# Patient Record
Sex: MALE | Race: WHITE
[De-identification: names, ages, dates, MRNs, and addresses within clinical notes are randomized per-mention and may not be internally consistent; named-entity substitution may affect disease eponyms.]

---

## 2017-05-15 ENCOUNTER — HOSPITAL ENCOUNTER (OUTPATIENT)
Dept: HOSPITAL 80 - FIMAGING | Age: 62
End: 2017-05-15
Attending: FAMILY MEDICINE
Payer: COMMERCIAL

## 2017-05-15 DIAGNOSIS — J18.9: Primary | ICD-10-CM

## 2017-06-03 ENCOUNTER — HOSPITAL ENCOUNTER (INPATIENT)
Dept: HOSPITAL 80 - FED | Age: 62
LOS: 8 days | Discharge: HOME HEALTH | DRG: 193 | End: 2017-06-11
Attending: INTERNAL MEDICINE | Admitting: INTERNAL MEDICINE
Payer: COMMERCIAL

## 2017-06-03 DIAGNOSIS — I24.8: ICD-10-CM

## 2017-06-03 DIAGNOSIS — J96.01: ICD-10-CM

## 2017-06-03 DIAGNOSIS — Q90.9: ICD-10-CM

## 2017-06-03 DIAGNOSIS — J18.9: Primary | ICD-10-CM

## 2017-06-03 DIAGNOSIS — G47.33: ICD-10-CM

## 2017-06-03 DIAGNOSIS — R56.9: ICD-10-CM

## 2017-06-03 DIAGNOSIS — E03.9: ICD-10-CM

## 2017-06-03 DIAGNOSIS — I50.33: ICD-10-CM

## 2017-06-03 DIAGNOSIS — K21.9: ICD-10-CM

## 2017-06-03 LAB
% IMMATURE GRANULYOCYTES: (no result) % (ref 0–1.1)
% IMMATURE GRANULYOCYTES: 0.5 % (ref 0–1.1)
ABSOLUTE IMMATURE GRANULOCYTES: (no result) 10^3/UL (ref 0–0.1)
ABSOLUTE IMMATURE GRANULOCYTES: 0.05 10^3/UL (ref 0–0.1)
ABSOLUTE NRBC COUNT: (no result) 10^3/UL (ref 0–0.01)
ABSOLUTE NRBC COUNT: 0.02 10^3/UL (ref 0–0.01)
ADD DIFF?: (no result)
ADD DIFF?: NO
ADD MORPH?: (no result)
ADD MORPH?: NO
ADD SCAN?: (no result)
ADD SCAN?: NO
ALBUMIN SERPL-MCNC: 3.4 G/DL (ref 3.5–5)
ALP SERPL-CCNC: 92 IU/L (ref 38–126)
ALT SERPL-CCNC: 25 IU/L (ref 21–72)
ANION GAP SERPL CALC-SCNC: 12 MEQ/L (ref 8–16)
AST SERPL-CCNC: 34 IU/L (ref 17–59)
ATYPICAL LYMPHOCYTE FLAG: (no result) (ref 0–99)
ATYPICAL LYMPHOCYTE FLAG: 50 (ref 0–99)
BILIRUB SERPL-MCNC: 0.6 MG/DL (ref 0.1–1.4)
BILIRUBIN-CONJUGATED: 0.5 MG/DL (ref 0–0.5)
BILIRUBIN-UNCONJUGATED: 0.1 MG/DL (ref 0–1.1)
CALCIUM SERPL-MCNC: 8.6 MG/DL (ref 8.5–10.4)
CHLORIDE SERPL-SCNC: 97 MEQ/L (ref 97–110)
CO2 SERPL-SCNC: 26 MEQ/L (ref 22–31)
CREAT SERPL-MCNC: 1 MG/DL (ref 0.7–1.3)
CREATINE KINASE-MB FRACTION: 0.96 NG/ML (ref 0–3.19)
ERYTHROCYTE [DISTWIDTH] IN BLOOD BY AUTOMATED COUNT: (no result) % (ref 11.5–15.2)
ERYTHROCYTE [DISTWIDTH] IN BLOOD BY AUTOMATED COUNT: 14.9 % (ref 11.5–15.2)
FRAGMENT RBC FLAG: (no result) (ref 0–99)
FRAGMENT RBC FLAG: 0 (ref 0–99)
GFR SERPL CREATININE-BSD FRML MDRD: > 60 ML/MIN/{1.73_M2}
GLUCOSE SERPL-MCNC: 121 MG/DL (ref 70–100)
HCT VFR BLD CALC: (no result) % (ref 40–51)
HCT VFR BLD CALC: 35 % (ref 40–51)
HGB BLD-MCNC: (no result) G/DL (ref 13.7–17.5)
HGB BLD-MCNC: 11.8 G/DL (ref 13.7–17.5)
LEFT SHIFT FLG: (no result) (ref 0–99)
LEFT SHIFT FLG: 10 (ref 0–99)
LIPEMIA HEMOLYSIS FLAG: (no result) (ref 0–99)
LIPEMIA HEMOLYSIS FLAG: 80 (ref 0–99)
MAGNESIUM SERPL-MCNC: 2.3 MG/DL (ref 1.6–2.3)
MCH RBC BLDCO QN: (no result) PG (ref 27.9–34.1)
MCH RBC BLDCO QN: 34.2 PG (ref 27.9–34.1)
MCHC RBC AUTO-ENTMCNC: (no result) G/DL (ref 32.4–36.7)
MCHC RBC AUTO-ENTMCNC: 33.7 G/DL (ref 32.4–36.7)
MCV RBC AUTO: (no result) FL (ref 81.5–99.8)
MCV RBC AUTO: 101.4 FL (ref 81.5–99.8)
NRBC-AUTO%: (no result) % (ref 0–0.2)
NRBC-AUTO%: 0.2 % (ref 0–0.2)
PLATELET # BLD: (no result) 10^3/UL (ref 150–400)
PLATELET # BLD: 357 10^3/UL (ref 150–400)
PLATELET CLUMPS FLAG: (no result) (ref 0–99)
PLATELET CLUMPS FLAG: 0 (ref 0–99)
PMV BLD AUTO: (no result) FL (ref 8.7–11.7)
PMV BLD AUTO: 10.4 FL (ref 8.7–11.7)
POTASSIUM SERPL-SCNC: 4.9 MEQ/L (ref 3.5–5.2)
PROT SERPL-MCNC: 7.2 G/DL (ref 6.3–8.2)
RBC # BLD AUTO: (no result) 10^6/UL (ref 4.4–6.38)
RBC # BLD AUTO: 3.45 10^6/UL (ref 4.4–6.38)
SODIUM SERPL-SCNC: 135 MEQ/L (ref 134–144)
TROPONIN I SERPL-MCNC: 0.05 NG/ML (ref 0–0.03)

## 2017-06-03 NOTE — EDPHY
H & P


HPI/ROS: 





HPI





CHIEF COMPLAINT:  Seizure, generalized tonic-clonic 5 minutes





HISTORY OF PRESENT ILLNESS:  This patient is 62-year-old male significant past 

medical history for down syndrome, seizure disorder, presents emergency room by 

EMS after he had a witnessed generalized tonic-clonic seizure in his chair at 

home.  He has a caregiver who witnessed the seizure.  The patient does have a 

history of seizure disorder and take seizure medications according to EMS there 

has been changes in his seizure medication however they were unsure exactly 

what changed to one of his medications.  His caregiver is able to give this 

information but is not here yet.  Patient arrives is alert and oriented x3, 

answering questions appropriately he denies pain anywhere.  EMS reports that he 

was postictal when they evaluated him and he had an O2 sat of 76% the placed on 

a non-rebreather and brought him here to the emergency room.  Upon arrival here 

the patient has a cough and has upper airway secretions with a room air 

saturation of 85%.





Past Medical History:  Seizure disorder, down syndrome, thyroid disease





Past Surgical History:  No recent surgical history





Social History:  Denies daily use drugs alcohol tobacco products





Family History:  Noncontributory








ROS   


REVIEW OF SYSTEMS:


A comprehensive 10 point review of systems is otherwise negative aside from 

elements mentioned in the history of present illness.








Exam   


Constitutional  appears well nontoxic triage nursing summary reviewed, vital 

signs reviewed, awake/alert. (85%RA sat)


Eyes   normal conjunctivae and sclera, EOMI, PERRLA. 


HENT   normal inspection, atraumatic, moist mucus membranes, no epistaxis, neck 

supple/ no meningismus, no raccoon eyes. 


Respiratory  rhonchi in the upper airway and upper lung fields


Cardiovascular   rate normal, regular rhythm, no murmur, no edema, distal 

pulses normal. 


Gastrointestinal   soft, non-tender, no rebound, no guarding, normal bowel 

sounds, no distension, no pulsatile mass. 


Genitourinary   no CVA tenderness. 


Musculoskeletal  no midline vertebral tenderness, full range of motion, no calf 

swelling, no tenderness of extremities, no meningismus, good pulses, 

neurovascularly intact.


Skin   pink, warm, & dry, no rash, skin atraumatic. 


Neurologic   awake, alert and oriented x 3, AAOx3, moves all 4 extremities 

equally, motor intact, sensory intact, CN II-XII intact, normal cerebellar, 

normal vision, normal speech. 


Psychiatric   normal mood/affect. 


Heme/Lymph/Immune   no lymphadenopathy.





Differential Diagnosis:  Includes but is not limited to in a particular order, 

breakthrough seizure, electrolyte disturbance, infection, pneumonia, aspiration 

from seizure, medication change leading to breakthrough seizure





Medical Decision Making:  Plan for this patient full cardiac monitor, IV 

establishment, blood draw, EKG, gentle IV hydration chest x-ray two view to 

rule out aspiration or recent pneumonia.





Re-evaluation:








2316:  At this time the caregiver has shown up and additional history is given.

  The caregiver tells me that he thinks he may have had a seizure was seated at 

the kitchen table and had a brief episode of unresponsiveness with staring off 

into space and some hand jerking.  It is reported by the caregiver that he was 

not confused afterwards there was no postictal state.  The caregiver thinks he 

may have passed out.  He also reports that he has been dealing with bronchitis 

for 2 weeks and shortness of breath labored breathing at home.  It was reported 

by EMS that he had a recent seizure medication change however the caregiver 

tells me that there has been no change in his medications.  Also reports that 

there was no postictal state.





EKG interpretation by me on record in [facility] system.  Impression time of 

EKG 2309:  This is sinus rhythm rate of 60, I do not appreciate acute ischemic 

changes on his EKG.  And 1 I compare his EKG to old EKG dated 10/7/2009 is 

similar morphology.





ED x-ray chest:  Cardiomegaly present.  Significant amount of pulmonary edema 

with bat winning appearance.








1222:  At this time I did go and see the patient he still has mild labored 

breathing and rhonchi throughout all lung fields.  His workup here in the 

emergency room shows a positive troponin shows an elevated BNP shows an x-ray 

that is concerning for decompensated heart failure with cardiomegaly bilateral 

pulmonary edema.  I have ordered this patient 40 mg IV Lasix, full-dose aspirin

, he does not have chest pain. He is on a breathing treatment at this time.  He 

is stable. He does have labored breathing.  He will need to be admitted to PCU 

for decompensated heart failure.  His CT angiogram at this time is pending for 

PE.  This will also help us visualize his lung fields as this could also be 

pneumonia with elevated white blood cell count however he does not have an 

elevated lactic acid.  He did receive initially 500 cc of fluid normal saline 

when he arrived they have stopped all this fluid at this time.  I spoke with 

Dr. Mahan the hospitalist who agrees to admit this patient.





1256AM:  This patient's CT angiogram shows no pulmonary embolism however does 

show a dense right pneumonia.  Blood cultures have been ordered.  IV Rocephin 

azithromycin has been ordered.


Source: Patient, EMS





- Personal History


Tetanus Vaccine Date: <10 years





- Medical/Surgical History


Other PMH: Down syndrome, hypothyroid





- Social History


Smoking Status: Never smoked


Constitutional: 


 Initial Vital Signs











O2 Sat (%)  94   06/03/17 22:45








 











O2 Delivery Mode               Oxymizer


 


O2 (L/minute)                  4














Allergies/Adverse Reactions: 


 





carbamazepine [From Tegretol] Allergy (Verified 06/03/17 22:59)


 


NONFORM Allergy (Uncoded 12/13/12 09:55)


 


OIL BASED PAINTS Allergy (Uncoded 12/13/12 09:55)


 








Home Medications: 














 Medication  Instructions  Recorded


 


Flonase Nasal Spray  03/05/16


 


Ibuprofen [Motrin (*)] 600 mg PO Q8 #20 tab 03/05/16


 


Lamictal  03/05/16


 


Metamucil  03/05/16


 


Multivit with Calcium,Iron,Min  03/05/16


 


Omega-3  03/05/16


 


Prilosec  03/05/16


 


Stelazine 2MG (RX)  03/05/16


 


Synthroid  03/05/16


 


Wellbutrin 100mg (RX)  03/05/16














Medical Decision Making





- Diagnostics


Imaging Results: 


 Imaging Impressions





Chest X-Ray  06/03/17 22:45


Impression: Abnormalities as detailed above, congestive heart failure with 

possible associated pneumonia not excluded.








Chest/Thorax CTA  06/03/17 23:32


Impression: 


1. No evidence of pulmonary embolic disease.


2. Dense right lower lobe pulmonary consolidation is presumably pneumonia. 

Follow-up is recommended to assure resolution.


3. See above report for additional findings.


 


Results called and discussed with Johnny Hamm MD on 6/4/2017 at 0:40


 


 


 














- Data Points


Laboratory Results: 


 Laboratory Results





 06/03/17 22:50 





 06/03/17 22:30 





 











  06/03/17 06/03/17 06/03/17





  23:55 22:50 22:50


 


WBC      10.25 10^3/uL H 10^3/uL





     (3.80-9.50) 


 


RBC      3.45 10^6/uL L 10^6/uL





     (4.40-6.38) 


 


Hgb      11.8 g/dL L g/dL





     (13.7-17.5) 


 


Hct      35.0 % L %





     (40.0-51.0) 


 


MCV      101.4 fL H fL





     (81.5-99.8) 


 


MCH      34.2 pg H pg





     (27.9-34.1) 


 


MCHC      33.7 g/dL g/dL





     (32.4-36.7) 


 


RDW      14.9 % %





     (11.5-15.2) 


 


Plt Count      357 10^3/uL 10^3/uL





     (150-400) 


 


MPV      10.4 fL fL





     (8.7-11.7) 


 


Neut % (Auto)      74.5 % H %





     (39.3-74.2) 


 


Lymph % (Auto)      13.8 % L %





     (15.0-45.0) 


 


Mono % (Auto)      10.5 % %





     (4.5-13.0) 


 


Eos % (Auto)      0.0 % L %





     (0.6-7.6) 


 


Baso % (Auto)      0.7 % %





     (0.3-1.7) 


 


Nucleat RBC Rel Count      0.2 % %





     (0.0-0.2) 


 


Absolute Neuts (auto)      7.64 10^3/uL H 10^3/uL





     (1.70-6.50) 


 


Absolute Lymphs (auto)      1.41 10^3/uL 10^3/uL





     (1.00-3.00) 


 


Absolute Monos (auto)      1.08 10^3/uL H 10^3/uL





     (0.30-0.80) 


 


Absolute Eos (auto)      0.00 10^3/uL L 10^3/uL





     (0.03-0.40) 


 


Absolute Basos (auto)      0.07 10^3/uL 10^3/uL





     (0.02-0.10) 


 


Absolute Nucleated RBC      0.02 10^3/uL H 10^3/uL





     (0-0.01) 


 


Immature Gran %      0.5 % %





     (0.0-1.1) 


 


Immature Gran #      0.05 10^3/uL 10^3/uL





     (0.00-0.10) 


 


VBG Lactic Acid    1.2 mmol/L mmol/L  





    (0.7-2.1)  


 


Sodium      





    


 


Potassium      





    


 


Chloride      





    


 


Carbon Dioxide      





    


 


Anion Gap      





    


 


BUN      





    


 


Creatinine      





    


 


Estimated GFR      





    


 


Glucose      





    


 


Calcium      





    


 


Magnesium      





    


 


Total Bilirubin      





    


 


Conjugated Bilirubin      





    


 


Unconjugated Bilirubin      





    


 


AST      





    


 


ALT      





    


 


Alkaline Phosphatase      





    


 


Creatine Kinase      





    


 


CK-MB (CK-2) Fraction      





    


 


Troponin I      





    


 


NT-Pro-B Natriuret Pep      





    


 


Total Protein      





    


 


Albumin      





    


 


Lipase      





    


 


Lamotrigine  Pending     





    














  06/03/17 06/03/17





  22:30 22:30


 


WBC    REJ 





   


 


RBC    TNP 





   


 


Hgb    TNP 





   


 


Hct    TNP 





   


 


MCV    TNP 





   


 


MCH    TNP 





   


 


MCHC    TNP 





   


 


RDW    TNP 





   


 


Plt Count    TNP 





   


 


MPV    TNP 





   


 


Neut % (Auto)    TNP 





   


 


Lymph % (Auto)    TNP 





   


 


Mono % (Auto)    TNP 





   


 


Eos % (Auto)    TNP 





   


 


Baso % (Auto)    TNP 





   


 


Nucleat RBC Rel Count    TNP 





   


 


Absolute Neuts (auto)    TNP 





   


 


Absolute Lymphs (auto)    TNP 





   


 


Absolute Monos (auto)    TNP 





   


 


Absolute Eos (auto)    TNP 





   


 


Absolute Basos (auto)    TNP 





   


 


Absolute Nucleated RBC    TNP 





   


 


Immature Gran %    TNP 





   


 


Immature Gran #    TNP 





   


 


VBG Lactic Acid    





   


 


Sodium  135 mEq/L mEq/L  





   (134-144)  


 


Potassium  4.9 mEq/L mEq/L  





   (3.5-5.2)  


 


Chloride  97 mEq/L mEq/L  





   ()  


 


Carbon Dioxide  26 mEq/l mEq/l  





   (22-31)  


 


Anion Gap  12 mEq/L mEq/L  





   (8-16)  


 


BUN  14 mg/dL mg/dL  





   (7-23)  


 


Creatinine  1.0 mg/dL mg/dL  





   (0.7-1.3)  


 


Estimated GFR  > 60   





   


 


Glucose  121 mg/dL H mg/dL  





   ()  


 


Calcium  8.6 mg/dL mg/dL  





   (8.5-10.4)  


 


Magnesium  2.3 mg/dL mg/dL  





   (1.6-2.3)  


 


Total Bilirubin  0.6 mg/dL mg/dL  





   (0.1-1.4)  


 


Conjugated Bilirubin  0.5 mg/dL mg/dL  





   (0.0-0.5)  


 


Unconjugated Bilirubin  0.1 mg/dL mg/dL  





   (0.0-1.1)  


 


AST  34 IU/L IU/L  





   (17-59)  


 


ALT  25 IU/L IU/L  





   (21-72)  


 


Alkaline Phosphatase  92 IU/L IU/L  





   ()  


 


Creatine Kinase  70 IU/L IU/L  





   (0-224)  


 


CK-MB (CK-2) Fraction  0.96 ng/mL ng/mL  





   (0-3.19)  


 


Troponin I  0.047 ng/mL H ng/mL  





   (0-0.034)  


 


NT-Pro-B Natriuret Pep  761 pg/mL H pg/mL  





   (0-125)  


 


Total Protein  7.2 g/dL g/dL  





   (6.3-8.2)  


 


Albumin  3.4 g/dL L g/dL  





   (3.5-5.0)  


 


Lipase  48.0 IU/L IU/L  





   ()  


 


Lamotrigine    





   











Medications Given: 


 








Discontinued Medications





Albuterol/Ipratropium (Duoneb)  3 ml IH EDNOW ONE


   Stop: 06/03/17 22:47


   Last Admin: 06/03/17 23:05 Dose:  3 ml


Aspirin Buffered (Aspirin Ec)  325 mg PO EDNOW ONE


   Stop: 06/03/17 23:34


   Last Admin: 06/03/17 23:45 Dose:  325 mg


Furosemide (Lasix Injection)  40 mg IVP EDNOW ONE


   Stop: 06/03/17 23:33


   Last Admin: 06/03/17 23:45 Dose:  40 mg


Sodium Chloride (Ns)  500 mls @ 0 mls/hr IV ONCE ONE


   PRN Reason: As Directed


   Stop: 06/03/17 22:46


   Last Admin: 06/03/17 23:06 Dose:  500 mls


Azithromycin 500 mg/ Dextrose  255 mls @ 255 mls/hr IV EDNOW ONE


   PRN Reason: Protocol


   Stop: 06/04/17 00:37


   Last Admin: 06/04/17 00:45 Dose:  255 mls


Ceftriaxone Sodium/Dextrose (Rocephin 1 Gm (Premix))  50 mls @ 100 mls/hr IV 

EDNOW ONE


   PRN Reason: Protocol


   Stop: 06/04/17 00:07


   Last Admin: 06/04/17 00:34 Dose:  50 mls








Departure





- Departure


Disposition: Home, Routine, Self-Care


Clinical Impression: 


 Hypoxia





CHF (congestive heart failure)


Qualifiers:


 Congestive heart failure type: unspecified congestive heart failure type 

Congestive heart failure chronicity: acute Qualified Code(s): I50.9 - Heart 

failure, unspecified





Pneumonia


Qualifiers:


 Pneumonia type: due to unspecified organism Laterality: right Lung location: 

lower lobe of lung Qualified Code(s): J18.1 - Lobar pneumonia, unspecified 

organism





Condition: Serious

## 2017-06-04 LAB
% IMMATURE GRANULYOCYTES: 0.5 % (ref 0–1.1)
ABSOLUTE IMMATURE GRANULOCYTES: 0.05 10^3/UL (ref 0–0.1)
ABSOLUTE NRBC COUNT: 0 10^3/UL (ref 0–0.01)
ADD DIFF?: NO
ADD MORPH?: NO
ADD SCAN?: NO
ANION GAP SERPL CALC-SCNC: 7 MEQ/L (ref 8–16)
APTT BLD: 31.5 SEC (ref 23–38)
ATYPICAL LYMPHOCYTE FLAG: 0 (ref 0–99)
CALCIUM SERPL-MCNC: 8.2 MG/DL (ref 8.5–10.4)
CHLORIDE SERPL-SCNC: 96 MEQ/L (ref 97–110)
CO2 SERPL-SCNC: 28 MEQ/L (ref 22–31)
COLOR UR: (no result)
CREAT SERPL-MCNC: 1 MG/DL (ref 0.7–1.3)
CREATINE KINASE-MB FRACTION: 0.9 NG/ML (ref 0–3.19)
ERYTHROCYTE [DISTWIDTH] IN BLOOD BY AUTOMATED COUNT: 14.4 % (ref 11.5–15.2)
FRAGMENT RBC FLAG: 0 (ref 0–99)
GFR SERPL CREATININE-BSD FRML MDRD: > 60 ML/MIN/{1.73_M2}
GLUCOSE SERPL-MCNC: 118 MG/DL (ref 70–100)
HCT VFR BLD CALC: 32.9 % (ref 40–51)
HGB BLD-MCNC: 11.2 G/DL (ref 13.7–17.5)
INR PPP: 1.17 (ref 0.83–1.16)
LEFT SHIFT FLG: 10 (ref 0–99)
LIPEMIA HEMOLYSIS FLAG: 90 (ref 0–99)
MAGNESIUM SERPL-MCNC: 2.2 MG/DL (ref 1.6–2.3)
MCH RBC BLDCO QN: 33.6 PG (ref 27.9–34.1)
MCHC RBC AUTO-ENTMCNC: 34 G/DL (ref 32.4–36.7)
MCV RBC AUTO: 98.8 FL (ref 81.5–99.8)
NITRITE UR QL STRIP: NEGATIVE
NRBC-AUTO%: 0 % (ref 0–0.2)
PH UR STRIP: 7 [PH] (ref 5–7.5)
PLATELET # BLD: 378 10^3/UL (ref 150–400)
PLATELET CLUMPS FLAG: 0 (ref 0–99)
PMV BLD AUTO: 10.1 FL (ref 8.7–11.7)
POTASSIUM SERPL-SCNC: 4.5 MEQ/L (ref 3.5–5.2)
PROTHROMBIN TIME: 14.9 SEC (ref 12–15)
RBC # BLD AUTO: 3.33 10^6/UL (ref 4.4–6.38)
SODIUM SERPL-SCNC: 131 MEQ/L (ref 134–144)
SP GR UR STRIP: 1.01 (ref 1–1.03)
TROPONIN I SERPL-MCNC: 0.05 NG/ML (ref 0–0.03)

## 2017-06-04 RX ADMIN — GUAIFENESIN SCH MG: 600 TABLET, EXTENDED RELEASE ORAL at 20:52

## 2017-06-04 RX ADMIN — GUAIFENESIN SCH MG: 600 TABLET, EXTENDED RELEASE ORAL at 14:40

## 2017-06-04 RX ADMIN — TRIFLUOPERAZINE HYDROCHLORIDE SCH MG: 1 TABLET, FILM COATED ORAL at 21:22

## 2017-06-04 RX ADMIN — AZITHROMYCIN MONOHYDRATE SCH MLS: 500 INJECTION, POWDER, LYOPHILIZED, FOR SOLUTION INTRAVENOUS at 09:28

## 2017-06-04 RX ADMIN — TRIFLUOPERAZINE HYDROCHLORIDE SCH MG: 1 TABLET, FILM COATED ORAL at 01:37

## 2017-06-04 RX ADMIN — IPRATROPIUM BROMIDE AND ALBUTEROL SULFATE SCH ML: .5; 3 SOLUTION RESPIRATORY (INHALATION) at 17:04

## 2017-06-04 RX ADMIN — ENOXAPARIN SODIUM SCH MG: 100 INJECTION SUBCUTANEOUS at 09:27

## 2017-06-04 RX ADMIN — FLUTICASONE PROPIONATE SCH SPRAY: 50 SPRAY, METERED NASAL at 21:07

## 2017-06-04 RX ADMIN — TRIFLUOPERAZINE HYDROCHLORIDE SCH MG: 1 TABLET, FILM COATED ORAL at 01:38

## 2017-06-04 RX ADMIN — ERTAPENEM SODIUM SCH MLS: 1 INJECTION, POWDER, LYOPHILIZED, FOR SOLUTION INTRAMUSCULAR; INTRAVENOUS at 09:28

## 2017-06-04 RX ADMIN — FLUTICASONE PROPIONATE SCH PUFFS: 110 AEROSOL, METERED RESPIRATORY (INHALATION) at 21:59

## 2017-06-04 RX ADMIN — IBUPROFEN SCH MG: 600 TABLET ORAL at 20:51

## 2017-06-04 RX ADMIN — IPRATROPIUM BROMIDE AND ALBUTEROL SULFATE SCH ML: .5; 3 SOLUTION RESPIRATORY (INHALATION) at 22:00

## 2017-06-04 NOTE — HOSPPROG
Hospitalist Progress Note


Assessment/Plan: 





#  acute hypoxic respiratory failure-2/2  community-acquired pneumonia-  oxygen 

saturation 75% on room air


    chest CT( personally reviewed and interpreted)  dense right lower lobe 

pneumonia no PE


   -  continue IV antibiotics


   -  continue inhaled  treatment


   -  encourage IS





#  community-acquired pneumonia-  blood cultures from emergency department 

pending-  WBC  10.5


   -  continue IV  ertapenem and azithromycin ( covering for aspiration until 

seen by speech)


   -  speech therapy eval ordered





#  indeterminate troponin-  very suspicious secondary to acute illness-  no 

active chest pain complaints


   -  continue to monitor





#  seizure disorder-  continue home meds





#  diet regular


#  prophylaxis Lovenox


# disposition greater than 2 midnights of patient presenting with severe 

community-acquired pneumonia with dense right infiltrate





 I have discussed the case with the RN will continue aggressive pulmonary 

toilet and supportive care


Subjective: short of breath


Objective: 


 Vital Signs











Temp Pulse Resp BP Pulse Ox


 


 36.8 C   66   16   106/62   75 L


 


 06/04/17 11:49  06/04/17 11:49  06/04/17 11:49  06/04/17 11:49  06/04/17 13:37








 Laboratory Results





 06/04/17 05:10 





 06/04/17 05:10 





 











 06/03/17 06/04/17 06/05/17





 05:59 05:59 05:59


 


Intake Total  500 


 


Output Total  850 375


 


Balance  -350 -375








 











PT  14.9 SEC (12.0-15.0)   06/04/17  05:10    


 


INR  1.17  (0.83-1.16)  H  06/04/17  05:10    














- Physical Exam


Constitutional: chronically ill appearing


Eyes: anicteric sclera


Ears, Nose, Mouth, Throat: dry mucous membranes


Cardiovascular: regular rate and rhythym


Respiratory: expiratory wheeze, inspiratory crackles


Gastrointestinal: normoactive bowel sounds


Genitourinary: no bladder fullness


Skin: warm, normal color


Musculoskeletal: No asymmetric calves


Neurologic: No asterixes, No facial droop


Psychiatric: interacting appropriately


Lymph, Heme, Immunologic: no cervical LAD





ICD10 Worksheet


Patient Problems: 


 Problems











Problem Status Onset


 


CHF (congestive heart failure) Acute  


 


Hypoxia Acute  


 


Pneumonia Acute

## 2017-06-04 NOTE — ECHO
[id number]

[id number]



+---------------------------------------------------+      [address]

:                                                   :       [place] [address]

:                                                   :           [phone]

+---------------------------------------------------+       Fax [phone]



                       Adult Echocardiographic Report



+----------------------------------------------------------------------------

----+

:Name: AMANDA CHONG BStudy Date: 2017 10:48 AM                         

    :

:MRN: [id number]     Hospital Admission Number: [id number] Location:

 206:

:: 1955     Gender: Male                           Height: 60 in    

    :

:Age: 62 yrs         Race: WH                               Weight: 160 lb   

    :

:Reason For Study: Eval LV FX                                                

    :

:                                                           BSA: 1.7 meters2 

    :

:History: Cough, SOB                                                         

    :

+----------------------------------------------------------------------------

----+

MMode/2D Measurements \T\ Calculations

IVSd: 1.1 cm   LVIDd: 4.1 cm  FS: 35.6 %             Ao root diam: 2.6 cm

LVPWd: 1.1 cm  LVIDs: 2.6 cm  EDV(Teich): 74.9 ml    ACS: 1.8 cm

                              ESV(Teich): 25.8 ml

                              EF(Teich): 65.6 %



Normal Measurement Values:

+----------------------------------------------------------------------------

----------------------------------+

:LVIDd (3.5-5.7cm)    IVSd (0.6-1.1cm)     LVPWd (0.6-1.1cm)     Aortic Root 

(2.0-3.7cm)Left Atrium (1.5-4.0cm):

:LV Vol(d) (76-115ml) LV Vol(s) (29-48ml)  Ejec Fraction (50-65%)PV Ganesh (0.6-

1.2m/s)    TV Ganesh (0.4-1.0m/s)    :

:MV E Ganesh (0.8-1.0m/s)MV A Ganesh (0.3-1.0m/s)LVOT Ganesh (0.7-1.2m/s) Asc Ao Ganesh (

0.9-1.8m/s)                       :

+----------------------------------------------------------------------------

----------------------------------+

Doppler Measurements \T\ Calculations

MV E max ganesh:        Ao V2 max:         LV V1 max:       PA V2 max:

76.0 cm/sec          126.2 cm/sec       74.0 cm/sec      120.8 cm/sec

MV A max ganesh:        Ao max PG:         LV V1 max PG:    PA max P.2 cm/sec         6.4 mmHg           2.2 mmHg         5.8 mmHg

MV E/A: 0.75

        _____________________________________________________________



TR max ganesh:

291.8 cm/sec

TR max P.1 mmHg

RAP systole:

5.0 mmHg

RVSP(TR): 39.1 mmHg



Left Ventricle

The left ventricle is normal in size. There is normal left ventricular wall

thickness. The left ventricular ejection fraction is normal. Ejection

Fraction = 65%. There is Doppler evidence for diastolic dysfunction. The

left ventricular wall motion is normal.





Right Ventricle

Borderline right ventricular enlargement. The right ventricular systolic

function is normal.



Atria

The left atrial size is normal. Right atrial size is normal.





Mitral Valve

The mitral valve is normal in structure and function. There is no mitral

regurgitation noted.



Tricuspid Valve

There is trace to mild tricuspid regurgitation. Right ventricular systolic

pressure is 40mmHg. There is Doppler evidence for mild pulmonary

hypertension.



Aortic Valve

The aortic valve is normal in structure and function. The aortic valve is

trileaflet. There is no aortic stenosis. Trace to mild aortic regurgitation.



Pulmonic Valve

The pulmonic valve is normal in structure and function. Trace pulmonic

valvular regurgitation.



Great Vessels

The aortic root is normal size.





Pericardium/Pleural

There is no pericardial effusion.



Conclusion

A complete two-dimensional transthoracic echocardiogram was performed (2D,

M-mode, Doppler and color flow Doppler).

The left ventricular ejection fraction is normal.

Ejection Fraction = 65%.

The left ventricular wall motion is normal.

There is Doppler evidence for diastolic dysfunction.

Borderline right ventricular enlargement.

The right ventricular systolic function is normal.

Normal appearing valvular structures.

Trace to mild aortic regurgitation.

There is trace to mild tricuspid regurgitation.

There is Doppler evidence for mild pulmonary hypertension.

Right ventricular systolic pressure is 40mmHg.

Trace pulmonic valvular regurgitation.



_____________________________________________________________________________







Final Reading Physician:

                        Herberth Kang signed on 2017 12:40 PM

Ordering Physician: Cami Mahan

Performed By: Gui Bliss, KARISCS

## 2017-06-04 NOTE — PDGENHP
History and Physical





- Chief Complaint


unresponsive episode, hypoxia





- History of Present Illness


Patient is a 62 year old male with down syndrome, seizure disorder, GERD, 

hypothyroidism, FAN who presents to the ED after an unresponsive episode at 

home. About 3 weeks ago, patient developed upper respiratory symptoms, with 

cough, chest congestion, mild dyspnea. He was evaluated by his PMD at that time 

and was determined to have an acute bronchitis, was given a 5-day course of 

antibiotics. His symptoms initially improved, however, never resolved 

completely and over the past several days his cough again began to worsen, 

become productive of thick sputum and associated with significant shortness of 

breath. This evening, while sitting down for dinner (had not yet started eating

) he had heavy breathing and then became unresponsive for a short period of 

time (< 1 min). The unresponsiveness was associated with slight facial twitching

; he did not fall out of his chair and regained consciousness. However, his 

breathing continued to appear labored. This episode was witnessed by his 

caregiver, who then immediately called EMS. Patient denies any chest pain, 

palpitations during this time, just cough and shortness of breath.





On arrival to the ED, patient was afebrile and hemodynamically stable, but 

hypoxic on room air. CXR was obtained and revealed basilar opacities. Labs were 

significant for mild leukocytosis, indeterminately elevated troponin, mildly 

elevated BNP. CT chest was then obtained to rule out pulmonary embolism, was 

negative for PE, but did show a dense RLL infiltrate consistent with pneumonia. 

Patient was then cultured, initiated on antibiotics and admitted for further 

management. 





History Information





- Allergies/Home Medication List


Allergies/Adverse Reactions: 








carbamazepine [From Tegretol] Allergy (Verified 06/03/17 22:59)


 


NONFORM Allergy (Uncoded 12/13/12 09:55)


 


OIL BASED PAINTS Allergy (Uncoded 12/13/12 09:55)


 





Home Medications: 








Flonase Nasal Spray  03/05/16 [Last Taken Unknown]


Lamictal  03/05/16 [Last Taken Unknown]


Metamucil  03/05/16 [Last Taken Unknown]


Multivit with Calcium,Iron,Min  03/05/16 [Last Taken Unknown]


Omega-3  03/05/16 [Last Taken Unknown]


Prilosec  03/05/16 [Last Taken Unknown]


Stelazine 2MG (RX)  03/05/16 [Last Taken Unknown]


Synthroid  03/05/16 [Last Taken Unknown]


Wellbutrin 100mg (RX)  03/05/16 [Last Taken Unknown]





I have personally reviewed and updated: family history, medical history, social 

history, surgical history





- Past Medical History


Additional medical history: Down syndrome.  GERD.  FAN on CPAP at night.  

seizure disorder, last witnessed seizure was > 8 years ago.  hypothyroidism





- Surgical History


Reports: no pertinent surgical hx





- Family History


Positive for: non-pertinent





- Social History


Smoking Status: Never smoked


Alcohol Use: None


Drug Use: None


Additional social history: Lives with a  24/7 caregiver.





Review of Systems


ROS: 10pt was reviewed & negative except for what was stated in HPI & below





Physical Exam














Temp Pulse Resp BP Pulse Ox


 


 37 C   61   20   118/64   97 


 


 06/03/17 22:58  06/04/17 00:00  06/04/17 00:00  06/04/17 00:00  06/04/17 00:00











Constitutional: no apparent distress, appears nourished, not in pain


Eyes: PERRL, anicteric sclera, EOMI


Ears, Nose, Mouth, Throat: moist mucous membranes, hearing normal, ears appear 

normal, no oral mucosal ulcers


Cardiovascular: regular rate and rhythym, no murmur, rub, or gallop, pulses 

symmetric bilaterally, No JVD, No edema


Peripheral Pulses: 2+: dorsalis-pedis (R), dorsalis-pedis (L)


Respiratory: no respiratory distress, inspiratory crackles, rhonchi (at R lung 

base)


Gastrointestinal: normoactive bowel sounds, soft, non-tender abdomen, no 

palpable masses, No tenderness, No guarding, No rebound


Genitourinary: no bladder fullness, no bladder tenderness


Skin: warm, normal color, no rashes or abrasions, no fluctuance, no induration, 

No mottled


Musculoskeletal: full muscle strength, no muscle tenderness, normal joint ROM, 

no joint effusions


Neurologic: AAOx3, sensation intact bilaterally, CN II-XII Intact, No weakness, 

No numbness


Psychiatric: interacting appropriately, not anxious, not encephalopathic, 

thought process linear





Lab Data & Imaging Review





 06/04/17 05:10





 06/04/17 05:10














WBC  10.25 10^3/uL (3.80-9.50)  H  06/03/17  22:50    


 


RBC  3.45 10^6/uL (4.40-6.38)  L  06/03/17  22:50    


 


Hgb  11.8 g/dL (13.7-17.5)  L  06/03/17  22:50    


 


Hct  35.0 % (40.0-51.0)  L  06/03/17  22:50    


 


MCV  101.4 fL (81.5-99.8)  H  06/03/17  22:50    


 


MCH  34.2 pg (27.9-34.1)  H  06/03/17  22:50    


 


MCHC  33.7 g/dL (32.4-36.7)   06/03/17  22:50    


 


RDW  14.9 % (11.5-15.2)   06/03/17  22:50    


 


Plt Count  357 10^3/uL (150-400)   06/03/17  22:50    


 


MPV  10.4 fL (8.7-11.7)   06/03/17  22:50    


 


Neut % (Auto)  74.5 % (39.3-74.2)  H  06/03/17  22:50    


 


Lymph % (Auto)  13.8 % (15.0-45.0)  L  06/03/17  22:50    


 


Mono % (Auto)  10.5 % (4.5-13.0)   06/03/17  22:50    


 


Eos % (Auto)  0.0 % (0.6-7.6)  L  06/03/17  22:50    


 


Baso % (Auto)  0.7 % (0.3-1.7)   06/03/17  22:50    


 


Nucleat RBC Rel Count  0.2 % (0.0-0.2)   06/03/17  22:50    


 


Absolute Neuts (auto)  7.64 10^3/uL (1.70-6.50)  H  06/03/17  22:50    


 


Absolute Lymphs (auto)  1.41 10^3/uL (1.00-3.00)   06/03/17  22:50    


 


Absolute Monos (auto)  1.08 10^3/uL (0.30-0.80)  H  06/03/17  22:50    


 


Absolute Eos (auto)  0.00 10^3/uL (0.03-0.40)  L  06/03/17  22:50    


 


Absolute Basos (auto)  0.07 10^3/uL (0.02-0.10)   06/03/17  22:50    


 


Absolute Nucleated RBC  0.02 10^3/uL (0-0.01)  H  06/03/17  22:50    


 


Immature Gran %  0.5 % (0.0-1.1)   06/03/17  22:50    


 


Immature Gran #  0.05 10^3/uL (0.00-0.10)   06/03/17  22:50    


 


VBG Lactic Acid  1.2 mmol/L (0.7-2.1)   06/03/17  22:50    


 


Sodium  135 mEq/L (134-144)   06/03/17  22:30    


 


Potassium  4.9 mEq/L (3.5-5.2)   06/03/17  22:30    


 


Chloride  97 mEq/L ()   06/03/17  22:30    


 


Carbon Dioxide  26 mEq/l (22-31)   06/03/17  22:30    


 


Anion Gap  12 mEq/L (8-16)   06/03/17  22:30    


 


BUN  14 mg/dL (7-23)   06/03/17  22:30    


 


Creatinine  1.0 mg/dL (0.7-1.3)   06/03/17  22:30    


 


Estimated GFR  > 60   06/03/17  22:30    


 


Glucose  121 mg/dL ()  H  06/03/17  22:30    


 


Calcium  8.6 mg/dL (8.5-10.4)   06/03/17  22:30    


 


Magnesium  2.3 mg/dL (1.6-2.3)   06/03/17  22:30    


 


Total Bilirubin  0.6 mg/dL (0.1-1.4)   06/03/17  22:30    


 


Conjugated Bilirubin  0.5 mg/dL (0.0-0.5)   06/03/17  22:30    


 


Unconjugated Bilirubin  0.1 mg/dL (0.0-1.1)   06/03/17  22:30    


 


AST  34 IU/L (17-59)   06/03/17  22:30    


 


ALT  25 IU/L (21-72)   06/03/17  22:30    


 


Alkaline Phosphatase  92 IU/L ()   06/03/17  22:30    


 


Creatine Kinase  70 IU/L (0-224)   06/03/17  22:30    


 


CK-MB (CK-2) Fraction  0.96 ng/mL (0-3.19)   06/03/17  22:30    


 


Troponin I  0.047 ng/mL (0-0.034)  H  06/03/17  22:30    


 


NT-Pro-B Natriuret Pep  761 pg/mL (0-125)  H  06/03/17  22:30    


 


Total Protein  7.2 g/dL (6.3-8.2)   06/03/17  22:30    


 


Albumin  3.4 g/dL (3.5-5.0)  L  06/03/17  22:30    


 


Lipase  48.0 IU/L ()   06/03/17  22:30    








Visualized and Interpreted Chest x-ray results: Yes


Chest X-Ray results: infiltrate (basilar)


Visualized and Interpreted imaging results: Yes


Interpretation: CT angio chest: no pulmonary embolism, dense R lower lung 

infiltrate


Visualized and Interpreted EKG results: Yes


EKG Interpretation: Positive for: normal sinsus rhythm (no st/t wave changes)





Assessment & Plan


Assessment: 


Patient is a 62 year old male with Down syndrome, GERD, hypothyroidism, Seizure 

disorder and FAN who presents to the ED with respiratory distress and an 

unresponsive episode.








Plan: 





# acute hypoxic respiratory failure


On initial arrival, thought respiratory distress was due to chf/fluid overload 

given elevated BNP/indeterminant troponin. However, once CT chest was obtained, 

it revealed a dense RLL consolidation, consistent with acute pneumonia. Patient 

does not meet SIRS/sepsis criteria on admission, with just mild leukocytosis 

present. Lactic acid is also negative. Caregiver does not feel patient is a 

significant aspiration, however, given the location of pneumonia, will cover 

for aspiration, check SLP. Will also check TTE, continue to trend troponins, 

EKGs to rule out cardiac etiology. 


- f/u blood cultures, obtain sputum culture


- cont ertapenem, azithromycin


- supplemental O2, nebs prn


- f/u cardiac work up





# syncopal episode


Caregiver's description of the description of the event does not sound 

consistent with a seizure, more like a syncopal event. May have been 

precipitated by hypoxia/respiratory distress. Seizure threshold may have been 

lowered by acute infection. Lamictal level is pending, will continue home 

medicine, place on seizure precautions. WIll also consult neurology.





# indeterminant troponin, elevated BNP


No known cardiac history, denies chest pain, palpitations. EKG is nonischemic. 

Will check TTE, trend troponin and follow EKGs.





# Down syndrome


Stable, will resume home meds.





# dispo: admit to inpatient service for > 2 MN stay





# gen:


regular diet


DVT ppx: lovenox


Full code

## 2017-06-04 NOTE — CPEKG
Heart Rate: 60

RR Interval: 1000

P-R Interval: 184

QRSD Interval: 84

QT Interval: 404

QTC Interval: 404

P Axis: -8

QRS Axis: 29

T Wave Axis: 33

EKG Severity - BORDERLINE ECG -

EKG Impression: SINUS RHYTHM

EKG Impression: BORDERLINE INFERIOR Q WAVES

EKG Impression: BORDERLINE T ABNORMALITIES, ANTERIOR LEADS

Electronically Signed By: Johnny Hamm 04-Jun-2017 07:05:26

## 2017-06-05 LAB
ANION GAP SERPL CALC-SCNC: 7 MEQ/L (ref 8–16)
CALCIUM SERPL-MCNC: 8.2 MG/DL (ref 8.5–10.4)
CHLORIDE SERPL-SCNC: 102 MEQ/L (ref 97–110)
CO2 SERPL-SCNC: 28 MEQ/L (ref 22–31)
CREAT SERPL-MCNC: 1 MG/DL (ref 0.7–1.3)
ERYTHROCYTE [DISTWIDTH] IN BLOOD BY AUTOMATED COUNT: 14.8 % (ref 11.5–15.2)
GFR SERPL CREATININE-BSD FRML MDRD: > 60 ML/MIN/{1.73_M2}
GLUCOSE SERPL-MCNC: 114 MG/DL (ref 70–100)
HCT VFR BLD CALC: 34.8 % (ref 40–51)
HGB BLD-MCNC: 11.4 G/DL (ref 13.7–17.5)
LIPEMIA HEMOLYSIS FLAG: 80 (ref 0–99)
MCH RBC BLDCO QN: 33.4 PG (ref 27.9–34.1)
MCHC RBC AUTO-ENTMCNC: 32.8 G/DL (ref 32.4–36.7)
MCV RBC AUTO: 102.1 FL (ref 81.5–99.8)
PLATELET # BLD: 401 10^3/UL (ref 150–400)
PLATELET CLUMPS FLAG: 0 (ref 0–99)
POTASSIUM SERPL-SCNC: 4.5 MEQ/L (ref 3.5–5.2)
RBC # BLD AUTO: 3.41 10^6/UL (ref 4.4–6.38)
SODIUM SERPL-SCNC: 137 MEQ/L (ref 134–144)

## 2017-06-05 RX ADMIN — IPRATROPIUM BROMIDE AND ALBUTEROL SULFATE SCH ML: .5; 3 SOLUTION RESPIRATORY (INHALATION) at 16:41

## 2017-06-05 RX ADMIN — IBUPROFEN SCH: 600 TABLET ORAL at 14:38

## 2017-06-05 RX ADMIN — LEVOTHYROXINE SODIUM SCH: 100 TABLET ORAL at 11:20

## 2017-06-05 RX ADMIN — FLUTICASONE PROPIONATE SCH SPRAY: 50 SPRAY, METERED NASAL at 22:36

## 2017-06-05 RX ADMIN — IPRATROPIUM BROMIDE AND ALBUTEROL SULFATE SCH ML: .5; 3 SOLUTION RESPIRATORY (INHALATION) at 06:00

## 2017-06-05 RX ADMIN — FLUTICASONE PROPIONATE SCH: 110 AEROSOL, METERED RESPIRATORY (INHALATION) at 09:43

## 2017-06-05 RX ADMIN — FLUTICASONE PROPIONATE SCH PUFFS: 110 AEROSOL, METERED RESPIRATORY (INHALATION) at 21:17

## 2017-06-05 RX ADMIN — PSYLLIUM HUSK SCH: 3.4 GRANULE ORAL at 11:21

## 2017-06-05 RX ADMIN — ERTAPENEM SODIUM SCH: 1 INJECTION, POWDER, LYOPHILIZED, FOR SOLUTION INTRAMUSCULAR; INTRAVENOUS at 11:20

## 2017-06-05 RX ADMIN — IBUPROFEN SCH MG: 600 TABLET ORAL at 22:35

## 2017-06-05 RX ADMIN — IBUPROFEN SCH: 600 TABLET ORAL at 11:20

## 2017-06-05 RX ADMIN — GUAIFENESIN SCH MG: 600 TABLET, EXTENDED RELEASE ORAL at 20:17

## 2017-06-05 RX ADMIN — PANTOPRAZOLE SODIUM SCH: 40 TABLET, DELAYED RELEASE ORAL at 11:21

## 2017-06-05 RX ADMIN — FLUTICASONE PROPIONATE SCH PUFFS: 110 AEROSOL, METERED RESPIRATORY (INHALATION) at 08:10

## 2017-06-05 RX ADMIN — IPRATROPIUM BROMIDE AND ALBUTEROL SULFATE SCH ML: .5; 3 SOLUTION RESPIRATORY (INHALATION) at 21:16

## 2017-06-05 RX ADMIN — TRIFLUOPERAZINE HYDROCHLORIDE SCH MG: 1 TABLET, FILM COATED ORAL at 20:17

## 2017-06-05 RX ADMIN — THERA TABS SCH: TAB at 11:21

## 2017-06-05 RX ADMIN — ENOXAPARIN SODIUM SCH: 100 INJECTION SUBCUTANEOUS at 11:19

## 2017-06-05 RX ADMIN — GUAIFENESIN SCH: 600 TABLET, EXTENDED RELEASE ORAL at 11:20

## 2017-06-05 RX ADMIN — IPRATROPIUM BROMIDE AND ALBUTEROL SULFATE SCH ML: .5; 3 SOLUTION RESPIRATORY (INHALATION) at 11:16

## 2017-06-05 RX ADMIN — AZITHROMYCIN MONOHYDRATE SCH: 500 INJECTION, POWDER, LYOPHILIZED, FOR SOLUTION INTRAVENOUS at 11:19

## 2017-06-05 NOTE — HOSPPROG
Hospitalist Progress Note


Assessment/Plan: 





#  acute hypoxic respiratory failure-2/2  community-acquired pneumonia-  oxygen 

saturation 98% on 2L


    chest CTA- no PE - CXR ( personally reviewed and interpreted) right sided 

infiltrate


   -  continue IV antibiotics


   -  continue inhaled treatments


   -  encourage IS





#  community-acquired pneumonia-  Bcx-NGTD-  WBC  10.5-> 6.3 this am


   -  continue IV  ceftriaxone and azithromycin 


   -  continue respiratory therapy





#  indeterminate troponin-  very suspicious secondary to acute illness-  no 

active chest pain complaints


   -  continue to monitor- will recheck in am





#  seizure disorder- may have had an event prior to admit while acutely ill 


   - dw Neurology continue home meds





# hypothyroid - cont synthroid


#  diet regular


#  prophylaxis Lovenox


# disposition greater than 2 midnights of patient presenting with severe 

community-acquired pneumonia with dense right infiltrate





 I have discussed the case with Neurology - event witnessed at home  prior may 

have been seizure although likely provoked by PNA no med changes


Subjective: still SOB but better


Objective: 


 Vital Signs











Temp Pulse Resp BP Pulse Ox


 


 36.6 C   66   16   121/67 H  98 


 


 06/05/17 11:28  06/05/17 11:28  06/05/17 11:28  06/05/17 11:28  06/05/17 11:28








 Laboratory Results





 06/05/17 03:24 





 06/05/17 03:24 





 











 06/04/17 06/05/17 06/06/17





 05:59 05:59 05:59


 


Intake Total 500 1060 140


 


Output Total 850 1150 250


 


Balance -350 -90 -110








 











PT  14.9 SEC (12.0-15.0)   06/04/17  05:10    


 


INR  1.17  (0.83-1.16)  H  06/04/17  05:10    














- Physical Exam


Constitutional: appears nourished, chronically ill appearing


Eyes: anicteric sclera


Ears, Nose, Mouth, Throat: moist mucous membranes


Cardiovascular: regular rate and rhythym


Respiratory: no respiratory distress, inspiratory crackles, No expiratory wheeze


Gastrointestinal: normoactive bowel sounds, soft, non-tender abdomen


Genitourinary: no bladder fullness


Skin: warm


Musculoskeletal: No asymmetric calves


Neurologic: AAOx3


Psychiatric: interacting appropriately


Lymph, Heme, Immunologic: no cervical LAD





ICD10 Worksheet


Patient Problems: 


 Problems











Problem Status Onset


 


CHF (congestive heart failure) Acute  


 


Hypoxia Acute  


 


Pneumonia Acute

## 2017-06-05 NOTE — GCON
[f rep st]



                                                                    CONSULTATION





HISTORY:  The patient is a 62-year-old gentleman whom I am asked to see in neurologic consultation r
egarding an episode of possible seizure activity that occurred 2 nights ago.  He said that he does n
ot remember anything, but his caregiver knows him well.  The patient has Down syndrome with some chr
onic cognitive limitations as a result of that.  He currently does complain of feeling a little bit 
dizzy.  I spoke to his caregiver, Kali, who explained that the patient has been dealing with the r
esidual affects of pneumonia over the last few weeks and seemed to be getting a little more congeste
d.  Two evenings ago, he was at home and had an episode that the caregiver witnessed in which he was
 staring blankly for perhaps a minute.  He did seem to be breathing and then had a few muscle twitch
es.  After this he started to come back around slowly and they called for emergency care and he came
 to the hospital for evaluation, where he has been admitted and monitored.  The patient probably has
 had 3 seizures in his lifetime that the caregiver is aware of, and they think the last one was prob
ably 8 years ago.  He is on chronic treatment with lamotrigine.  Other than his Down syndrome, with 
chronic cognitive slowing, he has not had any other specific neurologic issues.  Again, the patient 
cannot really elaborate on many details, but is able to tell me that he simply feels a little bit di
zzy, but otherwise doing fine.



PAST MEDICAL HISTORY:  Notable for the pneumonia as outlined above.



FAMILY HISTORY:  Not currently available.



SOCIAL HISTORY:  Not currently available, but he does live with his caregiver in his home in [facility].



PHYSICAL EXAMINATION:  He is awake, oriented to his name but that is all, beyond knowing that he francie
es in [place].  He is friendly and smiles and able to carry on a basic conversation.  He is unable to g
nelly details about the recent events.  Pupils 3 mm and reactive.  Extraocular movements intact.  Norm
al facial sensation and strength.  Motor exam:  Mild generalized weakness, but no focal weakness.



IMPRESSION:  The patient probably had a complex partial-type seizure event, likely precipitated by h
is recent pneumonia, and is rapidly getting back toward his baseline.  I do not find evidence of any
 other acute neurologic event.  



Today's visit was predominantly counseling regarding his condition with greater than 50% of the time
 in face-to-face discussion and review of the case.  Total unit time of 55 minutes.



DISPOSITION:  Once he is stabilized on his current doses of medicine, then he should be able to be d
ischarged back to the caregiver.





Job #:  [id number]

## 2017-06-06 LAB
ANION GAP SERPL CALC-SCNC: 5 MEQ/L (ref 8–16)
CALCIUM SERPL-MCNC: 8.3 MG/DL (ref 8.5–10.4)
CHLORIDE SERPL-SCNC: 102 MEQ/L (ref 97–110)
CO2 SERPL-SCNC: 31 MEQ/L (ref 22–31)
CREAT SERPL-MCNC: 0.9 MG/DL (ref 0.7–1.3)
ERYTHROCYTE [DISTWIDTH] IN BLOOD BY AUTOMATED COUNT: 14.8 % (ref 11.5–15.2)
GFR SERPL CREATININE-BSD FRML MDRD: > 60 ML/MIN/{1.73_M2}
GLUCOSE SERPL-MCNC: 95 MG/DL (ref 70–100)
HCT VFR BLD CALC: 33.9 % (ref 40–51)
HGB BLD-MCNC: 11.1 G/DL (ref 13.7–17.5)
LIPEMIA HEMOLYSIS FLAG: 80 (ref 0–99)
MCH RBC BLDCO QN: 33.2 PG (ref 27.9–34.1)
MCHC RBC AUTO-ENTMCNC: 32.7 G/DL (ref 32.4–36.7)
MCV RBC AUTO: 101.5 FL (ref 81.5–99.8)
PLATELET # BLD: 399 10^3/UL (ref 150–400)
PLATELET CLUMPS FLAG: 0 (ref 0–99)
POTASSIUM SERPL-SCNC: 4.7 MEQ/L (ref 3.5–5.2)
RBC # BLD AUTO: 3.34 10^6/UL (ref 4.4–6.38)
SODIUM SERPL-SCNC: 138 MEQ/L (ref 134–144)
TROPONIN I SERPL-MCNC: 0.01 NG/ML (ref 0–0.03)

## 2017-06-06 RX ADMIN — ENOXAPARIN SODIUM SCH MG: 100 INJECTION SUBCUTANEOUS at 08:36

## 2017-06-06 RX ADMIN — IBUPROFEN SCH MG: 600 TABLET ORAL at 16:11

## 2017-06-06 RX ADMIN — IBUPROFEN SCH MG: 600 TABLET ORAL at 06:12

## 2017-06-06 RX ADMIN — THERA TABS SCH EACH: TAB at 08:35

## 2017-06-06 RX ADMIN — IPRATROPIUM BROMIDE AND ALBUTEROL SULFATE SCH ML: .5; 3 SOLUTION RESPIRATORY (INHALATION) at 11:03

## 2017-06-06 RX ADMIN — GUAIFENESIN SCH MG: 600 TABLET, EXTENDED RELEASE ORAL at 21:06

## 2017-06-06 RX ADMIN — IPRATROPIUM BROMIDE AND ALBUTEROL SULFATE SCH ML: .5; 3 SOLUTION RESPIRATORY (INHALATION) at 21:29

## 2017-06-06 RX ADMIN — GUAIFENESIN SCH MG: 600 TABLET, EXTENDED RELEASE ORAL at 08:35

## 2017-06-06 RX ADMIN — AZITHROMYCIN MONOHYDRATE SCH MLS: 500 INJECTION, POWDER, LYOPHILIZED, FOR SOLUTION INTRAVENOUS at 08:39

## 2017-06-06 RX ADMIN — FLUTICASONE PROPIONATE SCH PUFFS: 110 AEROSOL, METERED RESPIRATORY (INHALATION) at 11:03

## 2017-06-06 RX ADMIN — FLUTICASONE PROPIONATE SCH PUFFS: 110 AEROSOL, METERED RESPIRATORY (INHALATION) at 21:52

## 2017-06-06 RX ADMIN — IPRATROPIUM BROMIDE AND ALBUTEROL SULFATE SCH ML: .5; 3 SOLUTION RESPIRATORY (INHALATION) at 16:43

## 2017-06-06 RX ADMIN — PSYLLIUM HUSK SCH EACH: 3.4 GRANULE ORAL at 08:36

## 2017-06-06 RX ADMIN — IBUPROFEN SCH MG: 600 TABLET ORAL at 21:08

## 2017-06-06 RX ADMIN — IPRATROPIUM BROMIDE AND ALBUTEROL SULFATE SCH ML: .5; 3 SOLUTION RESPIRATORY (INHALATION) at 06:03

## 2017-06-06 RX ADMIN — PANTOPRAZOLE SODIUM SCH MG: 40 TABLET, DELAYED RELEASE ORAL at 08:35

## 2017-06-06 RX ADMIN — LEVOTHYROXINE SODIUM SCH MCG: 100 TABLET ORAL at 06:12

## 2017-06-06 RX ADMIN — TRIFLUOPERAZINE HYDROCHLORIDE SCH MG: 1 TABLET, FILM COATED ORAL at 21:06

## 2017-06-06 NOTE — HOSPPROG
Hospitalist Progress Note


Assessment/Plan: 





# CAP, possible aspiration - cont rocephin and azith


   - check SLP eval


   - CXR tomorrow am


# acute hypoxic resp failure d/t CAP


# seizure disorder - cont lamictal 


   - neuro evaluated given possible sz in setting of acute illness - no change 

in meds


# indet troponin - no CP; likely d/t acute illness;  echo ok; outpatient f/u


# hypothyroid - synthroid


# ppx - lovenox











Subjective: sitting in chair; complains of some coughing


Objective: 


 Vital Signs











Temp Pulse Resp BP Pulse Ox


 


 36.9 C   59 L  15   117/75   98 


 


 06/06/17 08:13  06/06/17 08:13  06/06/17 08:13  06/06/17 08:13  06/06/17 08:13








 Laboratory Results





 06/06/17 05:23 





 06/06/17 05:23 





 











 06/05/17 06/06/17 06/07/17





 05:59 05:59 05:59


 


Intake Total 1060 740 


 


Output Total 1150 665 


 


Balance -90 75 








 











PT  14.9 SEC (12.0-15.0)   06/04/17  05:10    


 


INR  1.17  (0.83-1.16)  H  06/04/17  05:10    








chart reviewed


CT chest personally viewed and interpreted





- Physical Exam


Constitutional: no apparent distress, appears nourished


Cardiovascular: regular rate and rhythym, no murmur, rub, or gallop, systolic 

murmur


Respiratory: expiratory wheeze, bronchial breath sounds (R base), other (

tachypneic), No inspiratory crackles, No aegophony


Gastrointestinal: normoactive bowel sounds, soft, non-tender abdomen, no 

palpable masses





ICD10 Worksheet


Patient Problems: 


 Problems











Problem Status Onset


 


Chronic Disease Mgmt/Transitional Care Acute  


 


CHF (congestive heart failure) Acute  


 


Hypoxia Acute  


 


Pneumonia Acute

## 2017-06-07 LAB
% IMMATURE GRANULYOCYTES: 0.4 % (ref 0–1.1)
ABSOLUTE IMMATURE GRANULOCYTES: 0.02 10^3/UL (ref 0–0.1)
ABSOLUTE NRBC COUNT: 0 10^3/UL (ref 0–0.01)
ADD DIFF?: NO
ADD MORPH?: NO
ADD SCAN?: NO
ANION GAP SERPL CALC-SCNC: 9 MEQ/L (ref 8–16)
ATYPICAL LYMPHOCYTE FLAG: 30 (ref 0–99)
CALCIUM SERPL-MCNC: 8.1 MG/DL (ref 8.5–10.4)
CHLORIDE SERPL-SCNC: 101 MEQ/L (ref 97–110)
CO2 SERPL-SCNC: 29 MEQ/L (ref 22–31)
CREAT SERPL-MCNC: 1.1 MG/DL (ref 0.7–1.3)
ERYTHROCYTE [DISTWIDTH] IN BLOOD BY AUTOMATED COUNT: 14.8 % (ref 11.5–15.2)
FRAGMENT RBC FLAG: 0 (ref 0–99)
GFR SERPL CREATININE-BSD FRML MDRD: > 60 ML/MIN/{1.73_M2}
GLUCOSE SERPL-MCNC: 82 MG/DL (ref 70–100)
HCT VFR BLD CALC: 33.2 % (ref 40–51)
HGB BLD-MCNC: 10.8 G/DL (ref 13.7–17.5)
LEFT SHIFT FLG: 0 (ref 0–99)
LIPEMIA HEMOLYSIS FLAG: 80 (ref 0–99)
MCH RBC BLDCO QN: 33 PG (ref 27.9–34.1)
MCHC RBC AUTO-ENTMCNC: 32.5 G/DL (ref 32.4–36.7)
MCV RBC AUTO: 101.5 FL (ref 81.5–99.8)
NRBC-AUTO%: 0 % (ref 0–0.2)
PLATELET # BLD: 418 10^3/UL (ref 150–400)
PLATELET CLUMPS FLAG: 0 (ref 0–99)
PMV BLD AUTO: 11.4 FL (ref 8.7–11.7)
POTASSIUM SERPL-SCNC: 4.7 MEQ/L (ref 3.5–5.2)
RBC # BLD AUTO: 3.27 10^6/UL (ref 4.4–6.38)
SODIUM SERPL-SCNC: 139 MEQ/L (ref 134–144)

## 2017-06-07 RX ADMIN — FLUTICASONE PROPIONATE SCH SPRAY: 50 SPRAY, METERED NASAL at 02:38

## 2017-06-07 RX ADMIN — ENOXAPARIN SODIUM SCH MG: 100 INJECTION SUBCUTANEOUS at 08:09

## 2017-06-07 RX ADMIN — AZITHROMYCIN MONOHYDRATE SCH MLS: 500 INJECTION, POWDER, LYOPHILIZED, FOR SOLUTION INTRAVENOUS at 09:51

## 2017-06-07 RX ADMIN — IBUPROFEN SCH MG: 600 TABLET ORAL at 05:36

## 2017-06-07 RX ADMIN — THERA TABS SCH EACH: TAB at 08:11

## 2017-06-07 RX ADMIN — IBUPROFEN SCH MG: 600 TABLET ORAL at 13:03

## 2017-06-07 RX ADMIN — IPRATROPIUM BROMIDE AND ALBUTEROL SULFATE SCH ML: .5; 3 SOLUTION RESPIRATORY (INHALATION) at 05:56

## 2017-06-07 RX ADMIN — FLUTICASONE PROPIONATE SCH PUFFS: 110 AEROSOL, METERED RESPIRATORY (INHALATION) at 21:10

## 2017-06-07 RX ADMIN — IBUPROFEN SCH MG: 600 TABLET ORAL at 21:14

## 2017-06-07 RX ADMIN — FLUTICASONE PROPIONATE SCH: 50 SPRAY, METERED NASAL at 21:27

## 2017-06-07 RX ADMIN — LEVOTHYROXINE SODIUM SCH MCG: 100 TABLET ORAL at 05:36

## 2017-06-07 RX ADMIN — IPRATROPIUM BROMIDE AND ALBUTEROL SULFATE SCH ML: .5; 3 SOLUTION RESPIRATORY (INHALATION) at 17:04

## 2017-06-07 RX ADMIN — PSYLLIUM HUSK SCH EACH: 3.4 GRANULE ORAL at 08:11

## 2017-06-07 RX ADMIN — GUAIFENESIN SCH MG: 600 TABLET, EXTENDED RELEASE ORAL at 21:15

## 2017-06-07 RX ADMIN — IPRATROPIUM BROMIDE AND ALBUTEROL SULFATE SCH ML: .5; 3 SOLUTION RESPIRATORY (INHALATION) at 10:29

## 2017-06-07 RX ADMIN — TAZOBACTAM SODIUM AND PIPERACILLIN SODIUM SCH MLS: 500; 4 INJECTION, SOLUTION INTRAVENOUS at 17:43

## 2017-06-07 RX ADMIN — TRIFLUOPERAZINE HYDROCHLORIDE SCH MG: 1 TABLET, FILM COATED ORAL at 21:14

## 2017-06-07 RX ADMIN — FLUTICASONE PROPIONATE SCH PUFFS: 110 AEROSOL, METERED RESPIRATORY (INHALATION) at 10:29

## 2017-06-07 RX ADMIN — PANTOPRAZOLE SODIUM SCH MG: 40 TABLET, DELAYED RELEASE ORAL at 08:10

## 2017-06-07 RX ADMIN — GUAIFENESIN SCH MG: 600 TABLET, EXTENDED RELEASE ORAL at 08:10

## 2017-06-07 NOTE — HOSPPROG
Hospitalist Progress Note


Assessment/Plan: 








# CAP, possible aspiration: worse today 


   - change to invanz and azith to better cover aspiration


   - need SLP eval


   - CXR tomorrow, check resp PCR, legionella, strep urine ag


   - check echo given bilat appearance


# reactive airways exacerbation: steroids + nebs


# somnolence: check ABG


# acute hypoxic resp failure d/t CAP


# seizure disorder - cont lamictal 


   - neuro evaluated given possible sz in setting of acute illness - no change 

in meds


# indet troponin - no CP; likely d/t acute illness;  echo ok; outpatient f/u


# down's syndrome - home meds


# hypothyroid - synthroid


# ppx - lovenox





# FCFT - will identify decision maker with CM and start to address if he 

continues to worsen








Subjective: denies SOB or cough


Objective: 


 Vital Signs











Temp Pulse Resp BP Pulse Ox


 


 36.5 C   66   20   129/62 H  90 L


 


 06/07/17 11:14  06/07/17 11:14  06/07/17 11:14  06/07/17 11:14  06/07/17 11:35








 Laboratory Results





 06/07/17 03:15 





 06/07/17 03:15 





 











 06/06/17 06/07/17 06/08/17





 05:59 05:59 05:59


 


Intake Total 740 620 305


 


Output Total 665 1500 200


 


Balance 75 -880 105








 











PT  14.9 SEC (12.0-15.0)   06/04/17  05:10    


 


INR  1.17  (0.83-1.16)  H  06/04/17  05:10    








CXR personally reviewed - worse today





- Physical Exam


Constitutional: no apparent distress, appears nourished


Cardiovascular: regular rate and rhythym, systolic murmur, No irregularly 

irregular, No tachycardia, No bradycardia


Respiratory: no respiratory distress, expiratory wheeze (R sided), inspiratory 

crackles (R sided), rhonchi (R sided)


Gastrointestinal: normoactive bowel sounds, soft, non-tender abdomen, no 

palpable masses





ICD10 Worksheet


Patient Problems: 


 Problems











Problem Status Onset


 


Chronic Disease Mgmt/Transitional Care Acute  


 


CHF (congestive heart failure) Acute  


 


Hypoxia Acute  


 


Pneumonia Acute

## 2017-06-08 LAB
% IMMATURE GRANULYOCYTES: 0.7 % (ref 0–1.1)
ABSOLUTE IMMATURE GRANULOCYTES: 0.06 10^3/UL (ref 0–0.1)
ABSOLUTE NRBC COUNT: 0 10^3/UL (ref 0–0.01)
ADD DIFF?: NO
ADD MORPH?: NO
ADD SCAN?: NO
ANION GAP SERPL CALC-SCNC: 12 MEQ/L (ref 8–16)
ATYPICAL LYMPHOCYTE FLAG: 0 (ref 0–99)
CALCIUM SERPL-MCNC: 8.9 MG/DL (ref 8.5–10.4)
CHLORIDE SERPL-SCNC: 99 MEQ/L (ref 97–110)
CO2 SERPL-SCNC: 26 MEQ/L (ref 22–31)
CREAT SERPL-MCNC: 0.9 MG/DL (ref 0.7–1.3)
ERYTHROCYTE [DISTWIDTH] IN BLOOD BY AUTOMATED COUNT: 14.6 % (ref 11.5–15.2)
FRAGMENT RBC FLAG: 0 (ref 0–99)
GFR SERPL CREATININE-BSD FRML MDRD: > 60 ML/MIN/{1.73_M2}
GLUCOSE SERPL-MCNC: 135 MG/DL (ref 70–100)
HCT VFR BLD CALC: 33.9 % (ref 40–51)
HGB BLD-MCNC: 11.1 G/DL (ref 13.7–17.5)
LEFT SHIFT FLG: 0 (ref 0–99)
LIPEMIA HEMOLYSIS FLAG: 80 (ref 0–99)
MCH RBC BLDCO QN: 32.8 PG (ref 27.9–34.1)
MCHC RBC AUTO-ENTMCNC: 32.7 G/DL (ref 32.4–36.7)
MCV RBC AUTO: 100.3 FL (ref 81.5–99.8)
NRBC-AUTO%: 0 % (ref 0–0.2)
PLATELET # BLD: 441 10^3/UL (ref 150–400)
PLATELET CLUMPS FLAG: 10 (ref 0–99)
PMV BLD AUTO: 10.5 FL (ref 8.7–11.7)
POTASSIUM SERPL-SCNC: 4.9 MEQ/L (ref 3.5–5.2)
RBC # BLD AUTO: 3.38 10^6/UL (ref 4.4–6.38)
SODIUM SERPL-SCNC: 137 MEQ/L (ref 134–144)

## 2017-06-08 RX ADMIN — TAZOBACTAM SODIUM AND PIPERACILLIN SODIUM SCH MLS: 500; 4 INJECTION, SOLUTION INTRAVENOUS at 05:58

## 2017-06-08 RX ADMIN — IPRATROPIUM BROMIDE AND ALBUTEROL SULFATE SCH ML: .5; 3 SOLUTION RESPIRATORY (INHALATION) at 06:04

## 2017-06-08 RX ADMIN — THERA TABS SCH EACH: TAB at 08:43

## 2017-06-08 RX ADMIN — IBUPROFEN SCH MG: 600 TABLET ORAL at 21:50

## 2017-06-08 RX ADMIN — ENOXAPARIN SODIUM SCH MG: 100 INJECTION SUBCUTANEOUS at 08:49

## 2017-06-08 RX ADMIN — LEVOTHYROXINE SODIUM SCH MCG: 100 TABLET ORAL at 05:58

## 2017-06-08 RX ADMIN — PSYLLIUM HUSK SCH: 3.4 GRANULE ORAL at 07:41

## 2017-06-08 RX ADMIN — IPRATROPIUM BROMIDE AND ALBUTEROL SULFATE SCH: .5; 3 SOLUTION RESPIRATORY (INHALATION) at 11:27

## 2017-06-08 RX ADMIN — GUAIFENESIN SCH MG: 600 TABLET, EXTENDED RELEASE ORAL at 08:42

## 2017-06-08 RX ADMIN — GUAIFENESIN SCH MG: 600 TABLET, EXTENDED RELEASE ORAL at 21:53

## 2017-06-08 RX ADMIN — PANTOPRAZOLE SODIUM SCH MG: 40 TABLET, DELAYED RELEASE ORAL at 08:42

## 2017-06-08 RX ADMIN — IPRATROPIUM BROMIDE AND ALBUTEROL SULFATE SCH: .5; 3 SOLUTION RESPIRATORY (INHALATION) at 00:00

## 2017-06-08 RX ADMIN — AZITHROMYCIN MONOHYDRATE SCH MLS: 500 INJECTION, POWDER, LYOPHILIZED, FOR SOLUTION INTRAVENOUS at 08:44

## 2017-06-08 RX ADMIN — TAZOBACTAM SODIUM AND PIPERACILLIN SODIUM SCH MLS: 500; 4 INJECTION, SOLUTION INTRAVENOUS at 18:14

## 2017-06-08 RX ADMIN — FLUTICASONE PROPIONATE SCH SPRAY: 50 SPRAY, METERED NASAL at 21:59

## 2017-06-08 RX ADMIN — TRIFLUOPERAZINE HYDROCHLORIDE SCH MG: 1 TABLET, FILM COATED ORAL at 21:50

## 2017-06-08 RX ADMIN — FLUTICASONE PROPIONATE SCH PUFFS: 110 AEROSOL, METERED RESPIRATORY (INHALATION) at 10:36

## 2017-06-08 RX ADMIN — IBUPROFEN SCH MG: 600 TABLET ORAL at 14:24

## 2017-06-08 RX ADMIN — TAZOBACTAM SODIUM AND PIPERACILLIN SODIUM SCH MLS: 500; 4 INJECTION, SOLUTION INTRAVENOUS at 23:38

## 2017-06-08 RX ADMIN — FLUTICASONE PROPIONATE SCH PUFFS: 110 AEROSOL, METERED RESPIRATORY (INHALATION) at 22:03

## 2017-06-08 RX ADMIN — TAZOBACTAM SODIUM AND PIPERACILLIN SODIUM SCH MLS: 500; 4 INJECTION, SOLUTION INTRAVENOUS at 12:02

## 2017-06-08 RX ADMIN — TAZOBACTAM SODIUM AND PIPERACILLIN SODIUM SCH MLS: 500; 4 INJECTION, SOLUTION INTRAVENOUS at 01:03

## 2017-06-08 RX ADMIN — IPRATROPIUM BROMIDE AND ALBUTEROL SULFATE SCH ML: .5; 3 SOLUTION RESPIRATORY (INHALATION) at 22:03

## 2017-06-08 RX ADMIN — IBUPROFEN SCH MG: 600 TABLET ORAL at 05:58

## 2017-06-08 RX ADMIN — IPRATROPIUM BROMIDE AND ALBUTEROL SULFATE SCH ML: .5; 3 SOLUTION RESPIRATORY (INHALATION) at 17:20

## 2017-06-08 NOTE — HOSPPROG
Hospitalist Progress Note


Assessment/Plan: 








# CAP: overall looks improved today


   - changed to azith/zosyn (from azith/rocephin) yesterday given his apparent 

decompensation - looks better today


   - legionella, strep urine ag pending; he has been unable to provide a sputum 

sample


# reactive airways exacerbation: steroids added yesterday + nebs


# acute hypoxic resp failure d/t CAP


   - will gently diurese today in addition to other treatments


# seizure disorder - cont lamictal 


   - neuro evaluated given possible sz in setting of acute illness - no change 

in meds


# indet troponin - no CP; likely d/t acute illness;  echo ok; outpatient f/u


# down's syndrome - home meds


# hypothyroid - synthroid


# ppx - lovenox





# FCFT - his brother is his guardian/decision maker








Subjective: no complaints; sitting in chair


Objective: 


 Vital Signs











Temp Pulse Resp BP Pulse Ox


 


 36.4 C   51 L  18   110/57 L  92 


 


 06/08/17 07:27  06/08/17 07:27  06/08/17 07:27  06/08/17 07:27  06/08/17 07:27








 Microbiology











 06/07/17 13:43 Respiratory Panel (PCR) - Final





 Nasal, Sinus - [place] Viral Transport    No Organism Detected








 Laboratory Results





 06/07/17 03:15 





 06/07/17 03:15 





 











 06/07/17 06/08/17 06/09/17





 05:59 05:59 05:59


 


Intake Total 620 2585 


 


Output Total 1500 900 900


 


Balance -880 1685 -900








 











PT  14.9 SEC (12.0-15.0)   06/04/17  05:10    


 


INR  1.17  (0.83-1.16)  H  06/04/17  05:10    








CXR personally reviewed





- Physical Exam


Constitutional: chronically ill appearing


Cardiovascular: regular rate and rhythym, systolic murmur, No irregularly 

irregular, No diastolic murmur


Respiratory: inspiratory crackles (bilat bases), respiratory distress (mild), 

No reduced air movement, No expiratory wheeze


Gastrointestinal: normoactive bowel sounds, soft, non-tender abdomen, no 

palpable masses





ICD10 Worksheet


Patient Problems: 


 Problems











Problem Status Onset


 


Chronic Disease Mgmt/Transitional Care Acute  


 


CHF (congestive heart failure) Acute  


 


Hypoxia Acute  


 


Pneumonia Acute

## 2017-06-09 RX ADMIN — IPRATROPIUM BROMIDE AND ALBUTEROL SULFATE SCH ML: .5; 3 SOLUTION RESPIRATORY (INHALATION) at 10:42

## 2017-06-09 RX ADMIN — THERA TABS SCH EACH: TAB at 09:39

## 2017-06-09 RX ADMIN — TAZOBACTAM SODIUM AND PIPERACILLIN SODIUM SCH MLS: 500; 4 INJECTION, SOLUTION INTRAVENOUS at 17:32

## 2017-06-09 RX ADMIN — TAZOBACTAM SODIUM AND PIPERACILLIN SODIUM SCH MLS: 500; 4 INJECTION, SOLUTION INTRAVENOUS at 11:46

## 2017-06-09 RX ADMIN — FLUTICASONE PROPIONATE SCH PUFFS: 110 AEROSOL, METERED RESPIRATORY (INHALATION) at 10:43

## 2017-06-09 RX ADMIN — AZITHROMYCIN MONOHYDRATE SCH MLS: 500 INJECTION, POWDER, LYOPHILIZED, FOR SOLUTION INTRAVENOUS at 09:38

## 2017-06-09 RX ADMIN — TAZOBACTAM SODIUM AND PIPERACILLIN SODIUM SCH MLS: 500; 4 INJECTION, SOLUTION INTRAVENOUS at 23:28

## 2017-06-09 RX ADMIN — ENOXAPARIN SODIUM SCH MG: 100 INJECTION SUBCUTANEOUS at 09:38

## 2017-06-09 RX ADMIN — GUAIFENESIN SCH MG: 600 TABLET, EXTENDED RELEASE ORAL at 19:48

## 2017-06-09 RX ADMIN — PSYLLIUM HUSK SCH: 3.4 GRANULE ORAL at 09:05

## 2017-06-09 RX ADMIN — FLUTICASONE PROPIONATE SCH PUFFS: 110 AEROSOL, METERED RESPIRATORY (INHALATION) at 21:31

## 2017-06-09 RX ADMIN — IBUPROFEN SCH MG: 600 TABLET ORAL at 14:41

## 2017-06-09 RX ADMIN — IBUPROFEN SCH MG: 600 TABLET ORAL at 05:41

## 2017-06-09 RX ADMIN — FLUTICASONE PROPIONATE SCH SPRAY: 50 SPRAY, METERED NASAL at 19:51

## 2017-06-09 RX ADMIN — IBUPROFEN SCH MG: 600 TABLET ORAL at 21:59

## 2017-06-09 RX ADMIN — GUAIFENESIN SCH MG: 600 TABLET, EXTENDED RELEASE ORAL at 09:38

## 2017-06-09 RX ADMIN — TAZOBACTAM SODIUM AND PIPERACILLIN SODIUM SCH MLS: 500; 4 INJECTION, SOLUTION INTRAVENOUS at 05:41

## 2017-06-09 RX ADMIN — IPRATROPIUM BROMIDE AND ALBUTEROL SULFATE SCH ML: .5; 3 SOLUTION RESPIRATORY (INHALATION) at 06:02

## 2017-06-09 RX ADMIN — TRIFLUOPERAZINE HYDROCHLORIDE SCH MG: 1 TABLET, FILM COATED ORAL at 19:48

## 2017-06-09 RX ADMIN — PANTOPRAZOLE SODIUM SCH MG: 40 TABLET, DELAYED RELEASE ORAL at 09:39

## 2017-06-09 RX ADMIN — IPRATROPIUM BROMIDE AND ALBUTEROL SULFATE SCH ML: .5; 3 SOLUTION RESPIRATORY (INHALATION) at 16:39

## 2017-06-09 RX ADMIN — LEVOTHYROXINE SODIUM SCH MCG: 100 TABLET ORAL at 05:42

## 2017-06-09 RX ADMIN — IPRATROPIUM BROMIDE AND ALBUTEROL SULFATE SCH: .5; 3 SOLUTION RESPIRATORY (INHALATION) at 23:12

## 2017-06-09 NOTE — HOSPPROG
Hospitalist Progress Note


Assessment/Plan: 





 CAP: overall looks improved today


   - changed to azith/zosyn (from azith/rocephin) 6/7 given his apparent 

decompensation -improved to 6/8


   I suspect it was gentle diuresis and not abx that improved cxr


   no longer on diuretics


   legionella urinary Ag neg


   add IS


   


   


reactive airways exacerbation: steroids added yesterday + nebs





acute hypoxic resp failure d/t CAP


   add IS





seizure disorder - cont lamictal 


   - neuro evaluated given possible sz in setting of acute illness - no change 

in meds


indet troponin - no CP; likely d/t acute illness;  echo ok; outpatient f/u


   hold further workup





down's syndrome - home meds





hypothyroid - synthroid





ppx - lovenox





Subjective: feels breathing improved.  cxr improved from 6/7-6/8 (images 

reviewed/interp by me)


Objective: 


 Vital Signs











Temp Pulse Resp BP Pulse Ox


 


 36.6 C   55 L  18   111/56 L  95 


 


 06/09/17 11:37  06/09/17 11:37  06/09/17 11:37  06/09/17 11:37  06/09/17 11:37








 Microbiology











 06/04/17 00:28 Blood Culture - Final





 Blood 








 Laboratory Results





 06/08/17 09:40 





 06/08/17 09:40 





 











 06/08/17 06/09/17 06/10/17





 05:59 05:59 05:59


 


Intake Total 2585 925 240


 


Output Total 900 900 


 


Balance 1685 25 240








 











PT  14.9 SEC (12.0-15.0)   06/04/17  05:10    


 


INR  1.17  (0.83-1.16)  H  06/04/17  05:10    














- Physical Exam


Constitutional: no apparent distress, appears nourished


Eyes: PERRL, anicteric sclera, EOMI


Ears, Nose, Mouth, Throat: moist mucous membranes, hearing normal


Cardiovascular: regular rate and rhythym, no murmur, rub, or gallop, No 

tachycardia


Respiratory: no respiratory distress, other (crackles R base), No no rales or 

rhonchi


Gastrointestinal: normoactive bowel sounds, soft, non-tender abdomen


Genitourinary: no bladder fullness, No bailey in urethra


Skin: warm, normal color


Musculoskeletal: full muscle strength, no muscle tenderness


Neurologic: AAOx3





ICD10 Worksheet


Patient Problems: 


 Problems











Problem Status Onset


 


CHF (congestive heart failure) Acute  


 


Chronic Disease Mgmt/Transitional Care Acute  


 


Hypoxia Acute  


 


Pneumonia Acute

## 2017-06-10 LAB
ANION GAP SERPL CALC-SCNC: 8 MEQ/L (ref 8–16)
CALCIUM SERPL-MCNC: 8.7 MG/DL (ref 8.5–10.4)
CHLORIDE SERPL-SCNC: 99 MEQ/L (ref 97–110)
CO2 SERPL-SCNC: 29 MEQ/L (ref 22–31)
CREAT SERPL-MCNC: 1 MG/DL (ref 0.7–1.3)
GFR SERPL CREATININE-BSD FRML MDRD: > 60 ML/MIN/{1.73_M2}
GLUCOSE SERPL-MCNC: 95 MG/DL (ref 70–100)
POTASSIUM SERPL-SCNC: 4.8 MEQ/L (ref 3.5–5.2)
SODIUM SERPL-SCNC: 136 MEQ/L (ref 134–144)

## 2017-06-10 RX ADMIN — LEVOTHYROXINE SODIUM SCH MCG: 150 TABLET ORAL at 05:24

## 2017-06-10 RX ADMIN — TAZOBACTAM SODIUM AND PIPERACILLIN SODIUM SCH MLS: 500; 4 INJECTION, SOLUTION INTRAVENOUS at 05:24

## 2017-06-10 RX ADMIN — IPRATROPIUM BROMIDE AND ALBUTEROL SULFATE SCH ML: .5; 3 SOLUTION RESPIRATORY (INHALATION) at 06:04

## 2017-06-10 RX ADMIN — GUAIFENESIN SCH MG: 600 TABLET, EXTENDED RELEASE ORAL at 08:53

## 2017-06-10 RX ADMIN — IBUPROFEN SCH MG: 600 TABLET ORAL at 14:08

## 2017-06-10 RX ADMIN — ACETYLCYSTEINE SCH ML: 100 SOLUTION ORAL; RESPIRATORY (INHALATION) at 22:29

## 2017-06-10 RX ADMIN — TRIFLUOPERAZINE HYDROCHLORIDE SCH MG: 1 TABLET, FILM COATED ORAL at 20:23

## 2017-06-10 RX ADMIN — FLUTICASONE PROPIONATE SCH PUFFS: 110 AEROSOL, METERED RESPIRATORY (INHALATION) at 09:00

## 2017-06-10 RX ADMIN — PANTOPRAZOLE SODIUM SCH MG: 40 TABLET, DELAYED RELEASE ORAL at 08:54

## 2017-06-10 RX ADMIN — ACETYLCYSTEINE SCH ML: 100 SOLUTION ORAL; RESPIRATORY (INHALATION) at 16:03

## 2017-06-10 RX ADMIN — IBUPROFEN SCH: 600 TABLET ORAL at 08:00

## 2017-06-10 RX ADMIN — FLUTICASONE PROPIONATE SCH PUFFS: 110 AEROSOL, METERED RESPIRATORY (INHALATION) at 21:59

## 2017-06-10 RX ADMIN — PSYLLIUM HUSK SCH: 3.4 GRANULE ORAL at 09:22

## 2017-06-10 RX ADMIN — IPRATROPIUM BROMIDE AND ALBUTEROL SULFATE SCH ML: .5; 3 SOLUTION RESPIRATORY (INHALATION) at 16:03

## 2017-06-10 RX ADMIN — IPRATROPIUM BROMIDE AND ALBUTEROL SULFATE SCH ML: .5; 3 SOLUTION RESPIRATORY (INHALATION) at 11:25

## 2017-06-10 RX ADMIN — FUROSEMIDE SCH MG: 10 INJECTION, SOLUTION INTRAMUSCULAR; INTRAVENOUS at 14:11

## 2017-06-10 RX ADMIN — FLUTICASONE PROPIONATE SCH SPRAY: 50 SPRAY, METERED NASAL at 20:24

## 2017-06-10 RX ADMIN — ENOXAPARIN SODIUM SCH MG: 100 INJECTION SUBCUTANEOUS at 09:17

## 2017-06-10 RX ADMIN — IBUPROFEN SCH MG: 600 TABLET ORAL at 21:53

## 2017-06-10 RX ADMIN — IPRATROPIUM BROMIDE AND ALBUTEROL SULFATE SCH ML: .5; 3 SOLUTION RESPIRATORY (INHALATION) at 22:29

## 2017-06-10 RX ADMIN — THERA TABS SCH EACH: TAB at 08:53

## 2017-06-10 RX ADMIN — AZITHROMYCIN MONOHYDRATE SCH MLS: 500 INJECTION, POWDER, LYOPHILIZED, FOR SOLUTION INTRAVENOUS at 09:14

## 2017-06-10 NOTE — HOSPPROG
Hospitalist Progress Note


Assessment/Plan: 





68 yo M with PMH of Down's syndrome, seizure d/o and holley presenting after being 

found down with associated hypoxic respiratory failure and PNA





# acute hypoxic respiratory failure: in the setting of pna as next, improved 

but with continued desaturations to the mid 80s on RA. CTA without PE, echo 

showing normal EF. Some component of hypoventilation and atelectasis 

contributing, minimally mobile. Continue to work on pulmonary toilet with IS, C&

DB, OOB to chair tid. 


# bilateral pna: personally reviewed cxr's/chest CTA and notable for bilateral 

infiltrates, R>L. Has been on azithro (day 7) and zosyn (day 4), and completed 

3 days of ceftriaxone prior to zosyn. Will plan for a 10 day total course of 

abx given relatively slow improvement and complete last 3 days on levofloxacin 


# acute on chronic diastolic heart failure: has e/o ble edema today and ? if 

continued hypoxia in part 2/2 pulmonary edema, will start gentle diuresis and 

monitor


# seizure vs syncope: found down and presumed was following seizure event 

likely precipitated by acute illness. Has not had any significant events on 

telemetry on personal review and has not had any recurrent issues. Appreciate 

neuro consult


# sz d/o: continue lamictal


# holley: continue cpap


# elevated trop: minimal elevation on arrival in the setting of hypoxia and 

likely seizure PTA, no chest pain or ecg changes, no wall motion abnormalities 

on echo. Suspect demand ischemia rather than ACS


# dispo: IP, will likely be ready for dc in coming 1-2 days so long as o2 needs 

continue to decrease


Patient new to my care. Old records reviewed and summarized as above. 


Subjective: no significant overnight events, patient states he is feeling good, 

denies sob or cough but was noted to be coughing prior to my entering room


Objective: 


 Vital Signs











Temp Pulse Resp BP Pulse Ox


 


 36.7 C   55 L  18   105/51 L  98 


 


 06/10/17 11:20  06/10/17 11:26  06/10/17 11:26  06/10/17 11:26  06/10/17 11:26








 Laboratory Results





 06/08/17 09:40 





 06/10/17 07:53 





 











 06/09/17 06/10/17 06/11/17





 05:59 05:59 05:59


 


Intake Total 925 1355 300


 


Output Total 900 400 


 


Balance 25 955 300








 











PT  14.9 SEC (12.0-15.0)   06/04/17  05:10    


 


INR  1.17  (0.83-1.16)  H  06/04/17  05:10    








awake alert pleasant


anicteric


op clear mmm


rrr mildly ranjith no mrg


dec bs at bases bilaterally, productive cough


soft nt nd


1+ ble edema


warm dry well perfused 


cognitive dysfunction noted but interactive and pleasant





- Time Spent With Patient


Time Spent with Patient: greater than 35 minutes


Time Spent with Patient: Greater than 35 minutes spent on this patients care, 

greater than 50% of time spent counseling, educating, and coordinating care 

regarding the above mentioned plan.





ICD10 Worksheet


Patient Problems: 


 Problems











Problem Status Onset


 


Chronic Disease Mgmt/Transitional Care Acute  


 


CHF (congestive heart failure) Acute  


 


Hypoxia Acute  


 


Pneumonia Acute

## 2017-06-11 VITALS
DIASTOLIC BLOOD PRESSURE: 78 MMHG | HEART RATE: 66 BPM | TEMPERATURE: 98 F | OXYGEN SATURATION: 91 % | SYSTOLIC BLOOD PRESSURE: 99 MMHG

## 2017-06-11 VITALS — RESPIRATION RATE: 16 BRPM

## 2017-06-11 LAB
% IMMATURE GRANULYOCYTES: 1.5 % (ref 0–1.1)
ABSOLUTE IMMATURE GRANULOCYTES: 0.14 10^3/UL (ref 0–0.1)
ABSOLUTE NRBC COUNT: 0 10^3/UL (ref 0–0.01)
ADD DIFF?: NO
ADD MORPH?: NO
ADD SCAN?: NO
ANION GAP SERPL CALC-SCNC: 5 MEQ/L (ref 8–16)
ATYPICAL LYMPHOCYTE FLAG: 20 (ref 0–99)
CALCIUM SERPL-MCNC: 8.7 MG/DL (ref 8.5–10.4)
CHLORIDE SERPL-SCNC: 100 MEQ/L (ref 97–110)
CO2 SERPL-SCNC: 33 MEQ/L (ref 22–31)
CREAT SERPL-MCNC: 1 MG/DL (ref 0.7–1.3)
ERYTHROCYTE [DISTWIDTH] IN BLOOD BY AUTOMATED COUNT: 15.1 % (ref 11.5–15.2)
FRAGMENT RBC FLAG: 0 (ref 0–99)
GFR SERPL CREATININE-BSD FRML MDRD: > 60 ML/MIN/{1.73_M2}
GLUCOSE SERPL-MCNC: 101 MG/DL (ref 70–100)
HCT VFR BLD CALC: 35.1 % (ref 40–51)
HGB BLD-MCNC: 11.3 G/DL (ref 13.7–17.5)
LEFT SHIFT FLG: 10 (ref 0–99)
LIPEMIA HEMOLYSIS FLAG: 80 (ref 0–99)
MCH RBC BLDCO QN: 33.1 PG (ref 27.9–34.1)
MCHC RBC AUTO-ENTMCNC: 32.2 G/DL (ref 32.4–36.7)
MCV RBC AUTO: 102.9 FL (ref 81.5–99.8)
NRBC-AUTO%: 0 % (ref 0–0.2)
PLATELET # BLD: 401 10^3/UL (ref 150–400)
PLATELET CLUMPS FLAG: 0 (ref 0–99)
PMV BLD AUTO: 10.6 FL (ref 8.7–11.7)
POTASSIUM SERPL-SCNC: 4.4 MEQ/L (ref 3.5–5.2)
RBC # BLD AUTO: 3.41 10^6/UL (ref 4.4–6.38)
SODIUM SERPL-SCNC: 138 MEQ/L (ref 134–144)

## 2017-06-11 RX ADMIN — ENOXAPARIN SODIUM SCH MG: 100 INJECTION SUBCUTANEOUS at 09:27

## 2017-06-11 RX ADMIN — FUROSEMIDE SCH MG: 10 INJECTION, SOLUTION INTRAMUSCULAR; INTRAVENOUS at 09:25

## 2017-06-11 RX ADMIN — IPRATROPIUM BROMIDE AND ALBUTEROL SULFATE SCH ML: .5; 3 SOLUTION RESPIRATORY (INHALATION) at 05:49

## 2017-06-11 RX ADMIN — IBUPROFEN SCH MG: 600 TABLET ORAL at 06:17

## 2017-06-11 RX ADMIN — ACETYLCYSTEINE SCH ML: 100 SOLUTION ORAL; RESPIRATORY (INHALATION) at 05:49

## 2017-06-11 RX ADMIN — PSYLLIUM HUSK SCH: 3.4 GRANULE ORAL at 09:29

## 2017-06-11 RX ADMIN — ACETYLCYSTEINE SCH ML: 100 SOLUTION ORAL; RESPIRATORY (INHALATION) at 10:11

## 2017-06-11 RX ADMIN — FLUTICASONE PROPIONATE SCH PUFFS: 110 AEROSOL, METERED RESPIRATORY (INHALATION) at 10:22

## 2017-06-11 RX ADMIN — PANTOPRAZOLE SODIUM SCH MG: 40 TABLET, DELAYED RELEASE ORAL at 09:25

## 2017-06-11 RX ADMIN — IPRATROPIUM BROMIDE AND ALBUTEROL SULFATE SCH ML: .5; 3 SOLUTION RESPIRATORY (INHALATION) at 10:11

## 2017-06-11 RX ADMIN — THERA TABS SCH EACH: TAB at 09:27

## 2017-06-11 RX ADMIN — LEVOTHYROXINE SODIUM SCH MCG: 150 TABLET ORAL at 06:17

## 2017-06-11 NOTE — PDIAF
- Diagnosis


Code Status: Full Code





- Medication Management


Discharge Medications: 


 Medications to Continue on Transfer





Ibuprofen [Motrin (*)] 600 mg PO Q8 #20 tab 03/05/16 [Last Taken Unknown]


Albuterol [Proventil Inhaler HFA (*)] 1 - 2 puffs IH Q4H PRN 06/04/17 [Last 

Taken Unknown]


Bacitracin/Polymyxin B Sulfate [Bacitracin-Polymyxin Eye Oint] 3.5 gm OP WHITTAKER 06/ 04/17 [Last Taken Unknown]


Econazole 1% [Spectazole 1%] 1 davion TP DAILY PRN 06/04/17 [Last Taken Unknown]


Fluticasone Hfa 110 Mcg [Flovent 110 MCG Hfa MDI (*)] 1 puffs IH BID 06/04/17 [

Last Taken Unknown]


Fluticasone Nasal [Flonase Nasal Spray] 1 sprays NASAL HS 06/04/17 [Last Taken 

Unknown]


Herbals/Supplements -Info Only 1 ea PO DAILY 06/04/17 [Last Taken Unknown]


Levothyroxine [Synthroid 100 mcg (*)] 100 mcg PO MOTUWETHFR@06 06/04/17 [Last 

Taken Unknown]


Levothyroxine [Synthroid 150 mcg (*)] 150 mcg PO SUSA@06 06/04/17 [Last Taken 

Unknown]


Miconazole Nitrate [Desenex] 1 davion TP DAILY PRN 06/04/17 [Last Taken Unknown]


Multivitamins [Multivitamin (*)] 1 each PO DAILY 06/04/17 [Last Taken Unknown]


Omeprazole 40 mg PO DAILYAC 06/04/17 [Last Taken Unknown]


Psyllium Husk (with Sugar) [Metamucil Packet] 1 each PO DAILY 06/04/17 [Last 

Taken Unknown]


Trifluoperazine HCl [Stelazine 1MG (*)] 3 mg PO HS 06/04/17 [Last Taken Unknown]


lamOTRIGine [Lamotrigine] 150 mg PO BID 06/04/17 [Last Taken Unknown]


Acetaminophen [Tylenol 325mg (*)] 650 mg PO Q4HRS PRN #0 tab 06/11/17 [Last 

Taken Unknown]


levOFLOXACIN [levAQUIN (*)] 750 mg PO DAILY AT 10AM #2 tab 06/11/17 [Last Taken 

Unknown]


predniSONE 40 mg PO DAILY 10 Days 06/11/17 [Last Taken Unknown]








Discharge Medications: Refer to the Discharge Home Medication list for PRN 

reason.





- Orders


Services needed: Home Care, Registered Nurse


Home Care Face to Face: I certify that this patient was under my care and that 

I had the required face-to-face encounter meeting the encounter requirements on 

the discharge day.  My findings support the fact that the patient is homebound 

as defined in CMS Chapter 7 Medicare Benefits Manual 30.1.1, The condition of 

the patient is such that there exists a normal inability to leave home and 

consequently, leaving home would require a considerable and taxing effort.


Oxygen: 2L continuous by NC for now





- Follow Up Care


Current Providers and Referrals: 


Frankel,Zara, MD [Primary Care Provider] - As per Instructions

## 2017-06-11 NOTE — PDDCSUM
Discharge Summary


Discharge Summary: 





Dates of service 6/4-6/11/17





Discharge dx: 


# acute hypoxic respiratory failure


# bilateral lower lobe pna


# acute on chronic diastolic heart failure


# presumed seizure vs less likely syncope


# seizure d/o


# holley


# demand ischemia





Consultations: neurology


Procedures performed: chest CTA, echo, videofluoro swallow evaluation





Hospital course by problem:


66 yo M with PMH of Down's syndrome, seizure d/o and holley presenting after being 

found down with associated hypoxic respiratory failure and PNA





# acute hypoxic respiratory failure: in the setting of pna as next, improved 

but with continued desaturations to the mid 80s on RA. CTA without PE, echo 

showing normal EF. Some component of hypoventilation and atelectasis 

contributing, and will need to dc w/supplemental o2. Has had e/o reactive 

airways exacerbation as well and has been on prednisone, will dc on pred taper


# bilateral pna: bilateral infiltrates on cxr but clinically improved s/p 7 

days azithro/zosyn (prior to that ctx), complete 10 day course with remainder 

of tx with levofloxacin


# acute on chronic diastolic heart failure: with improved pulmonary edema but 

continued mild BLE edema that has improved. Will need to f/u with PCP to 

determine if he will need to be on lasix chronically as an OP


# seizure vs syncope: found down and presumed was following seizure event 

likely precipitated by acute illness. Has not had any significant events on 

telemetry on personal review and has not had any recurrent issues. Appreciate 

neuro consult


# sz d/o: continue lamictal


# holley: continue cpap


# elevated trop: minimal elevation on arrival in the setting of hypoxia and 

likely seizure PTA, no chest pain or ecg changes, no wall motion abnormalities 

on echo. Suspect demand ischemia rather than ACS


# dispo: dc back home with home health and in care of his caregiver





Meds: see EHR


F/u with PCP


> 40 minutes spent in dc of patient more than half in coordination of care

## 2019-03-23 ENCOUNTER — HOSPITAL ENCOUNTER (INPATIENT)
Dept: HOSPITAL 80 - FED | Age: 64
LOS: 5 days | Discharge: NURSING, REHAB OR LONG-TERM CARE | DRG: 483 | End: 2019-03-28
Attending: INTERNAL MEDICINE | Admitting: INTERNAL MEDICINE
Payer: COMMERCIAL

## 2019-03-23 DIAGNOSIS — G40.909: ICD-10-CM

## 2019-03-23 DIAGNOSIS — K21.9: ICD-10-CM

## 2019-03-23 DIAGNOSIS — S42.202A: Primary | ICD-10-CM

## 2019-03-23 DIAGNOSIS — Q90.9: ICD-10-CM

## 2019-03-23 DIAGNOSIS — I27.20: ICD-10-CM
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
## 2019-03-23 DIAGNOSIS — G47.33: ICD-10-CM

## 2019-03-23 DIAGNOSIS — D62: ICD-10-CM

## 2019-03-23 DIAGNOSIS — I95.81: ICD-10-CM

## 2019-03-23 DIAGNOSIS — Z99.81: ICD-10-CM

## 2019-03-23 LAB
INR PPP: 1.05 (ref 0.83–1.16)
PLATELET # BLD: 272 10^3/UL (ref 150–400)
PROTHROMBIN TIME: 13.3 SEC (ref 12–15)

## 2019-03-23 PROCEDURE — 0RRK00Z REPLACEMENT OF LEFT SHOULDER JOINT WITH REVERSE BALL AND SOCKET SYNTHETIC SUBSTITUTE, OPEN APPROACH: ICD-10-PCS | Performed by: ORTHOPAEDIC SURGERY

## 2019-03-23 PROCEDURE — P9041 ALBUMIN (HUMAN),5%, 50ML: HCPCS

## 2019-03-23 PROCEDURE — A4565 SLINGS: HCPCS

## 2019-03-23 PROCEDURE — C1713 ANCHOR/SCREW BN/BN,TIS/BN: HCPCS

## 2019-03-23 RX ADMIN — FLUTICASONE PROPIONATE SCH SPR: 50 SPRAY, METERED NASAL at 21:25

## 2019-03-23 RX ADMIN — OXYCODONE HYDROCHLORIDE PRN MG: 15 TABLET ORAL at 21:23

## 2019-03-23 RX ADMIN — PERPHENAZINE SCH MG: 8 TABLET, FILM COATED ORAL at 21:25

## 2019-03-23 NOTE — PDCONSULT
Consultant Note: 





ORTHOPEDIC SURGERY CONSULT NOTE





Patient with history of siezure disorder, downs syndrome, oxygen dependent, and 

other social issues who is walker dependent fell onto left shoulder while using 

walker. Presented to [facility] ER where x-rays showed significant fracture 

dislocation and significant displacement of humeral head.





Discussed case with Dr. Rojas. His plan is below.





PLAN:


Admit to Hospital service


NPO after midnight


Plan for Left Shoulder David-arthroplasty vs ORIF Proximal humerus/humeral head 

tomorrow morning 3/24 approx 11:00am


Shoulder immobilizer. LACHO GARNICA


Ordered c-spine w/flex-ext series to rule out instability due to nature of 

injury.





Dr. Rojas will see patient tomorrow morning to discuss surgical plan and 

consent.








Karson Jose PA-C for Dr. Rojas (orthopedic surgery). Please call with any 

questions or concerns [phone]

## 2019-03-23 NOTE — EDPHY
General


Time Seen by Provider: 03/23/19 16:06


Narrative: 





CLINICAL IMPRESSION: 


 Closed left shoulder fracture dislocation


_______________________


ASSESSMENT/PLAN:


 63-year-old male with past medical history of Down syndrome, seizure disorder, 

mental health disorder, presents to the emergency department after failing to 

use his walker while ambulating, tripping and falling with both arms forward.  

Patient has a closed, fracture dislocation of the left shoulder.  He is 

neurovascularly intact.  No right arm pain.  No midline neck pain.  No other 

injuries.  He did not his head.  He is not anticoagulated.  X-rays reviewed 

with orthopedic physician assistant Karson in the ED who discussed with Dr. Rojas.  Patient will need surgery tomorrow morning.  He has been NPO since 

noon.  Plan to admit the patient to hospitalist service for surgery tomorrow.  

Discussed with Dr. Sutton who accepts admission.


_______________________


DIFFERENTIAL DX:


Differential includes but not limited to acute fracture, strain/sprain, joint 

dislocation, soft tissue contusion


_______________________


ED PROCEDURES:


Procedure:  Splint placement.





A  left arm sling splint was applied to left arm by ED tech, supervised by 

myself.  After application of the splint I returned and re-examined the 

patient.  The splint was adequately immobilizing the joint and distal to the 

splint the patient's circulation and sensation was intact.


_______________________


ED COURSE:


4:30 p.m.:  X-rays reviewed by myself.  Fracture dislocation of the left 

shoulder.  Neurovascularly intact.  Will discussed with Ortho.  X-rays reviewed 

Dr. McCollester.  NPO as of noon today.  Ortho paged





4:50PM Karson Sinclair, in ED to eval patient and discuss with Dr. Rojas.  

After discussion with Ortho, plan to admit the patient to hospitalist service 

with surgical repair tomorrow morning.  This was discussed with the patient and 

his caregiver who are in agreement.


_______________________


CHIEF COMPLAINT:  Left shoulder pain after ground level fall


_______________________


HPI:


 63-year-old male with a history of Down syndrome presents to the emergency 

department with his caregiver.  Caregiver provides majority of patient's 

history.  He reports the patient is supposed to ambulate with a walker, was not 

using a walker today when he tripped and fell forward with outstretched arms.  

He is here complaining of left shoulder pain.  He did not hit his head and has 

no other complaints.  No reports of numbness to the arm hand or fingers.  He is 

dependent on oxygen day and night at 2 L. He has past medical history of mental 

health illness and seizure disorder.  He is not anticoagulated.  He had lunch 

at noon but has not had anything since that time.  No prior Ortho injury or 

surgery to the left shoulder.


_______________________


PAST MEDICAL HISTORY: Seizure disorder, mental health illness, hypothyroidism, 

Down syndrome, oxygen dependent 





Pertinent Past Surgical History: None reported 





Social History:  Down syndrome, here with his caregiver, supposed to be using a 

walker but fell because he was not using a walker.


_______________________


REVIEW OF SYSTEMS:


All other systems negative


Constitutional:  No fever, no chills


Musculoskeletal:  No deformity, + joint pain


Skin:  No rashes, color change or open wounds.


Neurological:  No sensory loss or weakness.


_______________________


PHYSICAL EXAM:


General Appearance:  Alert, oriented, down syndrome, on 2 L oxygen which is his 

baseline, well hydrated, non-toxic appearing, VSS, no hypoxia.


Neurological:  Alert and oriented x 3, normal sensation to left deltoid and 

left arm.  Intact distal neurovascular exam.  No closed head injury


Skin: Warm, dry, no rashes, no nodules on palpation.  No open wounds


Musculoskeletal:  Pain to left shoulder.  Limited range of motion secondary to 

pain.  No reproducible pain to left elbow hand or wrist.  Right arm without 

pain and full range of motion.  No open wounds. 


_______________________


MEDICAL DECISION MAKING:


Patient was seen independently. Secondary supervising physician at time of 

evaluation was Dr. McCollester .


Diagnosis: Closed fracture dislocation of the left shoulder . New, requires 

workup


Summary: See assessment and plan for summary of ED visit 


Independent visualization of images, tracing, or specimens yes.


Decision to obtain medical records or history from someone other than the 

patient:  Patient's caregiver


Review / Summarize previous medical records:  None available


Discussed patient with another provider:  Physician assistant with Orthopedics, 

Karson, Dr. Sutton





Patient Progress:  Stable for admission. 





- History


Smoking Status: Never smoked





- Objective


Vital Signs: 


 Initial Vital Signs











Temperature (C)  37.1 C   03/23/19 15:53


 


Heart Rate  66   03/23/19 15:53


 


Respiratory Rate  16   03/23/19 15:53


 


Blood Pressure  138/59 H  03/23/19 15:53


 


O2 Sat (%)  94   03/23/19 15:53








 











O2 Delivery Mode               Nasal Cannula


 


O2 (L/minute)                  2














Allergies/Adverse Reactions: 


 





carbamazepine [From Tegretol] Allergy (Verified 06/03/17 22:59)


 


NONFORM Allergy (Uncoded 12/13/12 09:55)


 


OIL BASED PAINTS Allergy (Uncoded 12/13/12 09:55)


 








Home Medications: 














 Medication  Instructions  Recorded


 


Ibuprofen [Motrin (*)] 600 mg PO Q8 #20 tab 03/05/16


 


Albuterol [Proventil Inhaler HFA 1 - 2 puffs IH Q4H PRN 06/04/17





(*)]  


 


Bacitracin/Polymyxin B Sulfate 3.5 gm OP WHITTAKER 06/04/17





[Bacitracin-Polymyxin Eye Oint]  


 


Econazole 1% [Spectazole 1%] 1 davion TP DAILY PRN 06/04/17


 


Fluticasone Hfa 110 Mcg [Flovent 1 puffs IH BID 06/04/17





110 MCG Hfa MDI (*)]  


 


Fluticasone Nasal [Flonase Nasal 1 sprays NASAL HS 06/04/17





Spray]  


 


Herbals/Supplements -Info Only 1 ea PO DAILY 06/04/17


 


Levothyroxine [Synthroid 100 mcg 100 mcg PO MOTUWETHFR@06 06/04/17





(*)]  


 


Levothyroxine [Synthroid 150 mcg 150 mcg PO SUSA@06 06/04/17





(*)]  


 


Miconazole Nitrate [Desenex] 1 davion TP DAILY PRN 06/04/17


 


Multivitamins [Multivitamin (*)] 1 each PO DAILY 06/04/17


 


Omeprazole 40 mg PO DAILYAC 06/04/17


 


Psyllium Husk (with Sugar) 1 each PO DAILY 06/04/17





[Metamucil Packet]  


 


Trifluoperazine HCl [Stelazine 1MG 3 mg PO HS 06/04/17





(*)]  


 


lamOTRIGine [Lamotrigine] 150 mg PO BID 06/04/17


 


Acetaminophen [Tylenol 325mg (*)] 650 mg PO Q4HRS PRN #0 tab 06/11/17


 


levOFLOXACIN [levAQUIN (*)] 750 mg PO DAILY AT 10AM #2 tab 06/11/17


 


predniSONE 40 mg PO DAILY 10 Days 06/11/17














Departure





- Departure


Disposition: [facility] Inpatient Acute


Clinical Impression: 


 Fracture dislocation of shoulder joint





Condition: Fair


Referrals: 


Frankel,Zara, MD [Primary Care Provider] - As per Instructions

## 2019-03-23 NOTE — PDGENHP
History and Physical


History and Physical: 





CC:  Left shoulder pain after fall





HISTORY:  This patient who has Down syndrome and gait instability he is 

supposed to use a walker was not using his walker when he tripped over his shoe 

and fell at home today.  He landed on his left side and has suffered pain to 

his left shoulder.  On of evaluation here in the ER he has a fracture 

dislocation of the left shoulder.  He was seen by Dr. Rojas team in they are 

planning to do surgery tomorrow morning.





The patient has no pain besides his shoulder.  Other than this fall the patient 

does not have any other recent events at home, and he does not have any 

symptoms of any other recent illness.  He has a chronic seizure disorder but 

has not had a seizure for some years now.








ROS:  A comprehensive 10 system review revealed no other significant findings








PAST MEDICAL HISTORY:


Down syndrome


Seizure disorder


Sleep apnea on CPAP


Hypothyroidism on replacement


Heart murmur


Mild pulmonary hypertension 40 mm by echo in 2018





FAMILY MEDICAL HISTORY:


No concerning medical issues that he or his caretaker are aware of





SOCIAL HISTORY:


He lives with a caretaker in the caretakers home, the caretakers here with him 

now.  This is Mr. Kali Spann





MEDICATIONS:


The patients list has been reconciled by our clinical pharmacist in the EMR.  I 

have reviewed the list and ordered appropriate medicines.





PHYSICAL EXAMINATION:


Vital Signs:  All stable without fever





Examination:


General: alert, oriented, relaxed, in no distress


Sitting on [facility] with left shoulder in a sling; no signs of injury 

other than his shoulder


Skin: warm, dry, good color, no rash


HEENT: normal


Neck: no mass or jvd


Resps: relaxed


Lungs: clear breath sounds


Heart: regular, 1/6 systolic murmur


Abdomen: soft, nondistended, nontender, +BS, no mass


Upper Extremities:  Left shoulder in sling, normal pulse sensory and motor 

function distally


Lower Extremities: no edema, warm


No Bleeding or bruising


Neurologic: normal speech/language, normal CNs, no focal weakness


IV site: looks normal








LABORATORY DATA:


None so far





RADIOLOGY STUDIES:


I reviewed images from x-rays of the left shoulder, the humeral head is acutely 

fractured from the shaft and is quite a bit displaced inferiorly along side the 

shaft








ASSESSMENT:


* Fall with injury at home today, mechanical fall


* Fracture dislocation of left shoulder requiring surgical repair


* Down syndrome


* Sleep apnea with mild pulmonary hypertension, uses CPAP at home; stable at 

this time


* Seizure disorder, stable on medication


* No other acute or chronic medical issues that need attention at this time in 

terms of any preoperative diagnostic or treatment plans before surgery








PLANS:


* Admission to hospital


* Plan is surgery tomorrow morning


* Plan DVT prophylaxis after surgery


* Fall risk precautions


* PT and OT eval


* May well need skilled nursing rehabilitation, assess his mobility and safety 

after surgery but with a shoulder injury and need for walker he may not be able 

to go home safely.  Does live with a caregiver








I have reviewed the patient's case in detail with Kirill Obrien of the ER


I have reviewed the patient's past medical records as part of this assessment, 

including previous hospital admission records

## 2019-03-24 LAB — PLATELET # BLD: 224 10^3/UL (ref 150–400)

## 2019-03-24 RX ADMIN — FLUTICASONE PROPIONATE SCH SPR: 50 SPRAY, METERED NASAL at 23:35

## 2019-03-24 RX ADMIN — PANTOPRAZOLE SODIUM SCH: 40 TABLET, DELAYED RELEASE ORAL at 18:35

## 2019-03-24 RX ADMIN — OXYCODONE HYDROCHLORIDE PRN MG: 15 TABLET ORAL at 08:36

## 2019-03-24 RX ADMIN — Medication PRN MCG: at 18:01

## 2019-03-24 RX ADMIN — PSYLLIUM HUSK SCH: 3.4 GRANULE ORAL at 18:35

## 2019-03-24 RX ADMIN — THERA TABS SCH: TAB at 18:33

## 2019-03-24 RX ADMIN — Medication PRN MCG: at 17:38

## 2019-03-24 RX ADMIN — OXYCODONE HYDROCHLORIDE PRN MG: 15 TABLET ORAL at 04:18

## 2019-03-24 RX ADMIN — ENOXAPARIN SODIUM SCH: 100 INJECTION SUBCUTANEOUS at 18:32

## 2019-03-24 RX ADMIN — Medication SCH MLS: at 23:49

## 2019-03-24 RX ADMIN — Medication PRN MCG: at 18:21

## 2019-03-24 RX ADMIN — SODIUM CHLORIDE SCH MLS: 900 INJECTION, SOLUTION INTRAVENOUS at 23:33

## 2019-03-24 NOTE — HOSPPROG
Hospitalist Progress Note


Assessment/Plan: 





Discussed patients care plan with anesthesia--patient hypotensive in post 

operative period largely in systolics of 80s but reportedly without change in 

mental status and no other complaints. Surgery felt to be uncomplicated with 

minimal blood loss. Decision made to have patient transfer to ICU from PACU.





Called by nursing after patient arrival to ICU--now hypotensive to systolics of 

50-60s/30-40s. Went to bedside to evaluate patient. He does have Downs syndrome 

but is awake and alert, able to answer questions appropriately. Brothers 

present at bedside state he appears to be at baseline. Called Dr. Eric of 

surgery for central line placement and started NE with improved BP. Unclear 

what is leading to hypotension at this time--ordered ecg/troponin, repeat h/h, 

lactic acid. No e/o pna or ptx on cxr. Will get echo in am. Low threshold to 

start broad spectrum abx but at this time really nothing to suggest infection. 


> 45 min critical care time spent in evaluation of labs/imaging and 

coordination of care with specialists as well as at bedside coordinating with 

nurses and managing patient care. 


Objective: 


 Vital Signs











Temp Pulse Resp BP Pulse Ox


 


 36.6 C   81   22 H  80/65 L  95 


 


 03/24/19 20:08  03/24/19 17:59  03/24/19 20:08  03/24/19 20:08  03/24/19 20:08








 Laboratory Results





 03/24/19 18:36 





 











 03/23/19 03/24/19 03/25/19





 05:59 05:59 05:59


 


Intake Total  250 1830


 


Output Total  550 1400


 


Balance  -300 430








 











PT  13.3 SEC (12.0-15.0)   03/23/19  23:30    


 


INR  1.05  (0.83-1.16)   03/23/19  23:30    














ICD10 Worksheet


Patient Problems: 


 Problems











Problem Status Onset


 


Fracture dislocation of shoulder joint Acute  


 


Chronic Disease Mgmt/Transitional Care Acute  


 


CHF (congestive heart failure) Acute  


 


Hypoxia Acute  


 


Pneumonia Acute

## 2019-03-24 NOTE — GOP
[f rep st]



                                                                OPERATIVE REPORT





DATE OF OPERATION:  03/23/2019



SURGEON:  Sixto Rojas MD



ASSISTANT:  Karson Jose PA-C.



ANESTHESIA:  General.



PREOPERATIVE DIAGNOSIS:  Fracture, left proximal humerus.



POSTOPERATIVE DIAGNOSIS:  

1.  Fracture, left proximal humerus, with disruption of greater and lesser tuberosity fracture segmen
ts. 

2.  Chronic humeral fracture.



PROCEDURE PERFORMED:  

1.  Reverse left total shoulder arthroplasty. 

2.  Debridement, chronic fracture, humeral head.



FINDINGS:  





DESCRIPTION OF PROCEDURE:  The patient was taken to the operating room, administered general anesthes
ia, placed in the beach chair position.  The left shoulder and upper extremity were prepped and drape
d in normal sterile fashion.  A deltopectoral incision was made through dermal and subcutaneous tissu
es.  The anatomy was completely distorted.  We found the biceps interval and traced this proximally. 
 The subscapularis was already partially injured and avulsed medially with several small fracture fra
gments that were glommed in with abundant scar tissue anteriorly.  The capsular interval was split.  
The missing humeral head was eventually palpated inferiorly.  This was extensively scarred-in in the 
subdeltoid and axillary area.  Fragments were removed from the greater tuberosity segment that were r
eflected with the soft tissues and scarred in posteriorly.  We were eventually able to isolate out th
e canal and bring the humeral shaft more anteriorly.  The head segment had to be bluntly dissected ar
ound.  This was in a dangerous subaxillary zone.  It was completely scarred in and probably had been 
there for quite some time.  We were eventually able to free it up and get a Kocher upon it and bring 
it out.  Any further soft tissue attachments to this were released with the cautery.  This segment wa
s then completely removed.  We examined the proximal humeral shaft.  We determined that a primary hem
iarthroplasty or total shoulder arthroplasty would not be acceptable.  We elected to open a reverse t
otal shoulder system.  The neck cutting guide was placed in the proximal humerus and our proximal nec
k cut was made.  This is a little more distal than a typical head-neck cut.  The trials were put in p
lace.  We were able to ream up to a 12 distally, but proximally we were only able to get to a 10 broa
ch.  This broach fit a little bit snug, but was felt to be the appropriate size.  The head and neck p
rotector were put in place.  We subluxed the proximal humeral segment posteriorly.  This allowed us t
o put a Fukuda retractor posteriorly and a batwing retractor anteriorly.  The glenoid was exposed.  C
apsular release was performed around the glenoid.  The remaining labral tissue, which was minimal, wa
s removed.  The starting hole was then made just inferior to midline.  We then reamed with a small re
amer and extended up to the medium reamer.  The drill hole was then made for a glenosphere peg.  The 
glenosphere was a small glenosphere.  This was opened.  Bone graft taken from the head was packed int
o the canal of the glenosphere.  The glenosphere was then attached to its  and impacted into 
place in the glenoid.  This was then secured with 3 screws, one superior, one inferior, and one anter
ior.  We felt we got good fixation with these 3 screws.  Two locking nuts were then placed over the s
crews.  The glenosphere was then impacted into position and secured with the screw.  The proximal hum
erus was then re-exposed.  The broach was again passed down through the canal.  A size 10 broach was 
selected.  We initially went with a +2.5 liner.  This was way short.  We added a +5 mm humeral adapte
r tray.  We initially went with a less constrained liner.  This was not acceptable, and we had to rat
shalonda this up to a 38 mm, +2.5 constrained liner after making several trial attempts.  This was then f
elt to be stable.  



The real implants were opened.  They were assembled on the back table.  On insertion of the humeral #
10 stem, a crack was made in the proximal humerus.  The cerclage system was brought out and a cerclag
e wire was passed around the proximal humeral metadiaphyseal area.  This was tensioned and then the c
lamp was secured and crimped.  The wire was then cut to appropriate length.  The stem was then slight
ly reinserted further.  We felt we got good fixation proximally.  The real +5 mm humeral polyethylene
 insert was impacted onto the stem with the 2.5 mm x 38 mm constrained liner.  This was then reduced 
to the 38 mm glenosphere.  It was felt to be stable through a full range of motion.  Implants utilize
d were the Exactech reverse shoulder with a reverse glenoid plate, a 4.5 x 30 mm screw, a 4.5 x 38 mm
 screw, a 4.5 x 18 mm screw with locking caps placed over the superior screw and the anterior screw, 
a 38 mm glenosphere locking screw, a 10 mm humeral stem, a +5 humeral adapter tray, a 38 mm +2.5 cons
trained liner.  Thorough lavage was performed.  Closure was performed in the subscapularis and superi
or aspect of the pectoralis major with a #2 FiberWire suture.  Closure was performed of the deltopect
oral interval with a 2-0 Vicryl suture followed by closure of the subcutaneous tissues with a 2-0 Leo
ryl suture followed by closure of the dermis with staples and Dermabond.  A sterile compression dress
ing was applied.  



The patient tolerated the procedure well, was transferred back to the recovery room in stable conditi
on with a sling and abduction pillow applied.



COMPLICATIONS:  Difficult bone extraction secondary to chronic nature of injury.





Job #:  [id number]

## 2019-03-24 NOTE — PDANEPAE
ANE Past Medical History





- Pulmonary History


Hx COPD: Yes


Hx Oxygen in Use at Home: Yes


O2 in Use at Home (L/minute): 2


Hx Sleep Apnea: Yes


Sleep Apnea Screening Result - Last Documented: Positive





- Endocrine History


Hx Diabetes: No


Hypothyroid: Yes


Obesity: moderate





ANE Review of Systems


Review of Systems: 








ANE Patient History





- Allergies


Allergies/Adverse Reactions: 








carbamazepine [From Tegretol] Allergy (Verified 06/03/17 22:59)


 


NONFORM Allergy (Uncoded 12/13/12 09:55)


 


OIL BASED PAINTS Allergy (Uncoded 12/13/12 09:55)


 








- Home Medications


Home medications: home medication list seen and reviewed


Home Medications: 








Fluticasone Nasal [Flonase Nasal Spray] 1 sprays NASAL HS 06/04/17 [Last Taken 

Unknown]


Levothyroxine [Synthroid 100 mcg (*)] 100 mcg PO MOTUWETHFR@06 06/04/17 [Last 

Taken Unknown]


Levothyroxine [Synthroid 150 mcg (*)] 150 mcg PO SUSA@06 06/04/17 [Last Taken 

Unknown]


Multivitamins [Multivitamin (*)] 1 each PO DAILY 06/04/17 [Last Taken Unknown]


Psyllium Husk (with Sugar) [Metamucil Packet] 1 each PO DAILY 06/04/17 [Last 

Taken Unknown]


lamOTRIGine [Lamotrigine] 150 mg PO BID 06/04/17 [Last Taken Unknown]


Omeprazole [Omeprazole] 40 mg PO DAILY 03/23/19 [Last Taken Unknown]


Perphenazine [PERPHENAZINE] 8 mg PO HS 03/23/19 [Last Taken Unknown]


Herbals/Supplements -Info Only 1 ea PO DAILY 03/24/19 [Last Taken Unknown]


Omega-3 Fatty Acids [Fish Oil 1000 mg (*)] 1,000 mg PO DAILY 03/24/19 [Last 

Taken Unknown]








- NPO status


NPO Status: no food or drink >8 hours


NPO Since - Liquids (Date): 03/24/19


NPO Since - Liquids (Time): 00:00


NPO Since - Solids (Date): 03/24/19


NPO Since - Solids (Time): 00:00





- Anes Hx


Anes Hx: no prior problems





- Smoking Hx


Smoking Status: Never smoked





ANE Labs/Vital Signs





- Labs


Result Diagrams: 


 03/23/19 17:25





 03/23/19 17:25





- Vital Signs


Blood Pressure: 119/59


Heart Rate: 57


Respiratory Rate: 25


O2 Sat (%): 94


Height: 152.4 cm


Weight: 72.3 kg





ANE Physical Exam





- Airway


Neck exam: FROM


Mallampati Score: Class 3


Mouth exam: dentures





- Pulmonary


Pulmonary: no respiratory distress, reduced air movement





- Cardiovascular


Cardiovascular: regular rate and rhythym, no murmur, rub, or gallop





- ASA Status


ASA Status: III





ANE Anesthesia Plan


Anesthesia Plan: GA w LMA

## 2019-03-24 NOTE — POSTANESTH
Post Anesthetic Evaluation


Cardiovascular Status: Tx Hyper/Hypo-tension


Respiratory Status: Similar to Pre-op Cond.


Level of Consciousness/Mental Status: Can Participate in Eval, Mildly Sleepy, 

Arousable


Pain Control: Adequate, Prn Tx Ordered


Nausea/Vomiting Control: Adequate, Prn Tx Ordered


Complications Possibly Related to Anesthesia: Other, See Comments (Pt has been 

hypotensive in PACU (BP syst 70s-80s) since surgery.  EBL moderate, about 500ml

, and H/H now about 10.5/33.  IV fluid, phenylephrine and ephedrine help a 

little, but I've asked hospitalist to admit him to [facility]-ICU overnight.  She'll 

see him soon.)

## 2019-03-24 NOTE — PDMN
Medical Necessity


Medical necessity: Pt meets IP criteria as of 3/23 per MD and MCG MG-MD (

Musculoskeletal Disease); est los > 2 mn for ongoing tx and management of 

shoulder dislocation with fracture s/p fall requiring surgical reapir in a pt 

with down's syndrome and seizure disorder; requiring pain control, surgical 

consult, PT and OT.

## 2019-03-24 NOTE — ASMTCMCOM
CM Note

 

CM Note                       

Notes:

Pt with Down Syndrome, resides with caretaker had fall at home when not using his walker. Pt to OR 

today. PT/OT to eval when appropriate. 



Chart review indicates pt has had [facility] in the past. 



CM to follow for d/c planning. 

 

Date Signed:  03/24/2019 02:52 PM

Electronically Signed By:DENISE Gee

## 2019-03-24 NOTE — HOSPPROG
Hospitalist Progress Note


Assessment/Plan: 








Clemencia is a 63 Down syndrome male who fell while not using his walker  He 

sustained a left shoulder fracture dislocation. First encounter, chart 

reviewed. 





*left shoulder fx


-OR today





*gait instability w fall


-PT and OT to see


-has a caregiver who is with him at home





*Sleep apnea


-CPAP


-has pulmonary htn





*seizure disorder


-resumed home meds





*down syndrome


-at his baseline





*dvt prophylaxis: 


-initiate after surgery








*plan: IV fluids while NPO, pain management





Subjective: Clemencia said his pain is managed.


Objective: 


 Vital Signs











Temp Pulse Resp BP Pulse Ox


 


 36.6 C   63   17   138/68 H  95 


 


 03/24/19 04:00  03/24/19 04:00  03/24/19 04:00  03/24/19 04:00  03/24/19 04:00








 











 03/23/19 03/24/19 03/25/19





 05:59 05:59 05:59


 


Intake Total  250 


 


Output Total  550 


 


Balance  -300 








 











PT  13.3 SEC (12.0-15.0)   03/23/19  23:30    


 


INR  1.05  (0.83-1.16)   03/23/19  23:30    














- Physical Exam


Constitutional: no apparent distress, appears nourished


Eyes: PERRL, other (wearing glasses)


Ears, Nose, Mouth, Throat: hearing normal


Cardiovascular: regular rate and rhythym, systolic murmur (soft)


Respiratory: no respiratory distress


Gastrointestinal: normoactive bowel sounds


Skin: warm, other (left hand warm, good cms)


Psychiatric: interacting appropriately (alert and calm)





ICD10 Worksheet


Patient Problems: 


 Problems











Problem Status Onset


 


Fracture dislocation of shoulder joint Acute  


 


CHF (congestive heart failure) Acute  


 


Chronic Disease Mgmt/Transitional Care Acute  


 


Hypoxia Acute  


 


Pneumonia Acute

## 2019-03-25 LAB — PLATELET # BLD: 218 10^3/UL (ref 150–400)

## 2019-03-25 PROCEDURE — 02HV3DZ INSERTION OF INTRALUMINAL DEVICE INTO SUPERIOR VENA CAVA, PERCUTANEOUS APPROACH: ICD-10-PCS | Performed by: SURGERY

## 2019-03-25 RX ADMIN — PSYLLIUM HUSK SCH EACH: 3.4 GRANULE ORAL at 09:33

## 2019-03-25 RX ADMIN — ACETAMINOPHEN SCH MG: 500 TABLET ORAL at 21:05

## 2019-03-25 RX ADMIN — PERPHENAZINE SCH MG: 8 TABLET, FILM COATED ORAL at 21:05

## 2019-03-25 RX ADMIN — ENOXAPARIN SODIUM SCH MG: 100 INJECTION SUBCUTANEOUS at 09:27

## 2019-03-25 RX ADMIN — PANTOPRAZOLE SODIUM SCH MG: 40 TABLET, DELAYED RELEASE ORAL at 09:23

## 2019-03-25 RX ADMIN — ACETAMINOPHEN SCH MG: 500 TABLET ORAL at 14:29

## 2019-03-25 RX ADMIN — THERA TABS SCH EACH: TAB at 09:23

## 2019-03-25 RX ADMIN — PERPHENAZINE SCH MG: 8 TABLET, FILM COATED ORAL at 01:41

## 2019-03-25 RX ADMIN — Medication SCH MLS: at 05:32

## 2019-03-25 RX ADMIN — LEVOTHYROXINE SODIUM SCH MCG: 100 TABLET ORAL at 06:33

## 2019-03-25 RX ADMIN — SODIUM CHLORIDE SCH MLS: 900 INJECTION, SOLUTION INTRAVENOUS at 13:31

## 2019-03-25 RX ADMIN — FLUTICASONE PROPIONATE SCH SPR: 50 SPRAY, METERED NASAL at 21:06

## 2019-03-25 NOTE — ECHO
[email or web address]





[address] [address] 

Main: [phone] 



Echocardiography Examination 

Transthoracic 



Name:           AMANAD CHONG                         MR#:

[id number]

Study Date:     03/25/2019                            Study Time:

10:53 AM

YOB: 1955                            Age:          63

year(s)

Height:         152.4 cm (60 in.)                     Weight:

72.12 kg (159 lb.)

BSA:            1.69 m2                               Gender:

Male

Examination:    Echo                                  Contrast: 

Image Quality:  Technically Difficult                 Rhythm:

Normal sinus rhythm

Heart Rate:                                           BP:

106 mmHg/43 mmHg

Indication:     hypotension 



Procedure Staff 

Referring Physician: 

Echocardiographer:         Amber Ac [facility] 

Reading Physician:         Jose Alberto Martell MD 

Requesting Provider: 

Indication:                hypotension 



Measurements 

Chambers                                          AV/MV 

Label                 Value       Normal Value          Label

Value       Normal Value

EF lower range (%)      60 %                            AV PGmax

6 mmHg

EF upper range (%)      65 %                            AV Vmax

1.18 m/s

IVSd, 2D                1 cm      (0.6cm - 1.1cm)       BOBBY

(continuity eq.  2 cm2

LVDd, 2D                4.3 cm    (4.2cm - 5.9cm)       Vmax) 

LVDs, 2D                2.9 cm    (2.1cm - 4cm)         MV A Vmax

1.01 m/s

LVEF, 2D                61 %      (54% - 74%)           MV DT

257 ms

LVOT PGmax              2 mmHg                          MV E Vmax

0.99 m/s

LVOT Vmax               0.76 m/s  (0.7m/s - 1.1m/s)     MV E/A

0.98

LVOTd                   2 cm      (1.9cm - 2.1cm) TV/PV 

LVPWd, 2D               0.9 cm    (0.6cm - 1cm)         Label

Value       Normal Value

RVDd, 2D                3.6 cm    (1.9cm - 3.8cm)       RA Pressure

5 mmHg

TAPSE                   2 cm                            RVSP

32 mmHg

LADs, 2D                3 cm      (3cm - 4cm)           TR Pmax

27 mmHg

Additional Vessels                                      TR Vmax

2.58 m/s

Label                 Value       Normal Value          PV PGmax

6 mmHg

AoAsc                   2.9 cm                          PV Vmax,

Caliper     1.19 m/s     (0.6m/s - 0.9m/s)

AoRoot, MM              3 cm      (2.2cm - 3.7cm) 



Conclusions 



Patient: AMANDA CHONG                      MRN: [id number]

Study Date: 03/25/2019   Page 1 of 3

10:53 AM 









Left Ventricle: 

 Left ventricle is normal in size.  

 EF range is estimated at 60 % - 65 %.  

 There are no regional wall motion abnormalities.  

 Left ventricular diastolic function parameters are normal.  



Right Ventricle: 

 Grossly normal RV size and function..  

Left Atrium: 

 The left atrium is normal in size.  



Right Atrium: 

 The right atrium is normal in size.  



Tricuspid Valve: 

 Right Ventricular systolic pressure is measured at 32 mmHg.  



Pericardium: 

 No pericardial effusion.  



Findings 



Left Ventricle: 

Left ventricle is normal in size. Normal global systolic left

ventricular function. EF evaluated by fractional shortening

(2D). EF range is estimated at 60 % - 65 %. There are no regional wall

motion abnormalities. Left ventricular diastolic

function parameters are normal.  

Right Ventricle: 

Grossly normal RV size and function..  

Left Atrium: 

The left atrium is normal in size.  

Right Atrium: 

The right atrium is normal in size.  

Mitral Valve: 

Mitral valve appears structurally normal. Trivial mitral

regurgitation. No mitral valve stenosis.

Aortic Valve: 

Aortic leaflets are normal in appearance. Mild aortic regurgitation is

present. There is no aortic stenosis. The aortic

valve is probably trileaflet.  

Tricuspid Valve: 

Tricuspid valve leaflets are structurally normal. Mild tricuspid

regurgitation. Right Ventricular systolic pressure is

measured at 32 mmHg. Pulmonary artery pressure normal.  

Pulmonic Valve: 

Pulmonic leaflets are structurally normal.  

Aorta: 

The aortic root size in M-mode measures 3.0 cm. The aortic root

exhibits normal size. The ascending aorta measures

2.9 cm. Ascending aorta is normal in size.  

Aorta Measurements 

AoRoot, MM is 3.0 cm.  

IVC: 

The inferior vena cava is normal in size.  

Pericardium: 

No pericardial effusion.  



Exam Details 

Procedure Ordered:         Echo 



Patient: AMANDA CHONG                      MRN: [id number]

Study Date: 03/25/2019   Page 2 of 3

10:53 AM 









Procedure Status: Routine study 

Image Quality:             Technically Difficult 

Facility Location:         Cardiac Echo 1 



Electronically signed by Jose Alberto Martell MD on 03/25/2019 at 01:50 PM 

(No Signature Object) 



Patient: AMANDA CHONG                      MRN: [id number]

Study Date: 03/25/2019   Page 3 of 3

10:53 AM 







[id number]

## 2019-03-25 NOTE — GCON
[f rep st]



                                                                    CONSULTATION





CRITICAL CARE CONSULTATION



DATE OF CONSULTATION:  03/25/2019



REASON FOR CONSULTATION:  Intensive care unit evaluation and management of postoperative hypotension.




HISTORY:  The patient is a gentleman with underlying Down's syndrome.  He fell at home 2 days ago and
 had a fracture dislocation of his left shoulder.  He was taken to the operating room yesterday for s
urgical repair.  This went well.  Blood loss was not excessive; however, postoperatively he had hypot
ension, with systolic blood pressures reportedly in the 60s.  He was started on Levophed and admitted
 to the intensive care unit.  Hypotension has subsequently resolved.  He is off Levophed with blood p
ressures of approximately 110/60.  He is doing well, reportedly at baseline regarding his mental stat
us.  Urine output has been good.  He did receive intravenous fluids and is on normal saline at 75.  H
ematocrit is stable at approximately 30.  He has no signs or symptoms of infection.  White blood cell
 count is 15,000.



PAST MEDICAL HISTORY:  Remarkable for his Down's syndrome.  He has a history of obstructive sleep apn
ea and is on CPAP, seizure disorder on lamotrigine, hypothyroidism on replacement, and gastroesophage
al reflux disease on omeprazole.



SOCIAL HISTORY:  Alcohol and tobacco are negative.  He lives with a caregiver, has a supportive broth
er.



FAMILY HISTORY:  Negative/noncontributory.



REVIEW OF SYSTEMS:  Unobtainable.  Negative x10 except as noted above.



PHYSICAL EXAMINATION:  GENERAL:  Reveals a gentleman with Down's appearance.  VITAL SIGNS:  Blood pre
ssure is 111/60, heart rate 78 with sinus rhythm on the monitor.  Oxygen is in place at 4 L with satu
rations of 97%.  He is afebrile.  NECK:  Unremarkable for lymphadenopathy or thyromegaly.  HEENT:  Na
sal cannula oxygen is in place.  CHEST:  Clear bilaterally.  There are no rales, no rhonchi.  HEART: 
 Regular in rate and rhythm.  There are no gallops.  P2 appears normal.  ABDOMEN:  Somewhat overweigh
t, soft, nontender.  Bowel sounds are present but diminished.  EXTREMITIES:  Remarkable for the left 
shoulder/arm being in a sling post arthroplasty.  There is trace lower extremity edema.  NEUROLOGIC: 
 Examination is intact.  He is not particularly verbal.  He is awake and alert.



LABORATORY:  White blood cell count is 15,000 with hematocrit of 30.5.  Platelets are 218,000.  PT wa
s normal on admission.  Sodium is 135, potassium 4.6, CO2 28, BUN 15 with creatinine 0.7.  Glucose is
 139.  Troponins have been 0.077 and 0.106.  Urinalysis on admission was negative.



ASSESSMENT:  

1.  Postoperative hypotension.  Reason for this is unclear.  May have been a combination of factors i
ncluding anesthetics, postoperative medications, and perhaps some volume depletion.  He has responded
 nicely to treatment.  Hypotension has resolved.

2.  Left shoulder fracture, with total left shoulder arthroplasty.

3.  Acute blood-loss anemia:  Mild.  Hematocrit is 30.  Follow.

4.  Elevated troponins:  Mild, probably nonspecific.  Cardiac echo is within normal limits.

5.  Metabolic:  No issues identified currently.

6.  Prophylaxis:  He is on enoxaparin and pantoprazole.



PLAN AND RECOMMENDATIONS:  Patient will be kept in the intensive care unit for now.  Blood pressure r
emains normal.  He can be transferred to a medical-surgical status.  Hematocrit will be followed.  Cu
rrent medications to be continued.  Appropriate pain control will be maintained.  His usual outpatien
t medications will be continued. 



Further plans and recommendations will be made based on his progress over the next 12-24 hours.





Job #:  [id number]

## 2019-03-25 NOTE — SOAPPROG
SOAP Progress Note


Assessment/Plan: 


Assessment: POD #1 S/P Reverse Total Shoulder Arthroplasty, Left. Performed by 

Dr. Rojas





Post-operative hypotension. Transferred to ICU for monitoring. Dr. Eric place 

a central line yesterday PM. Remains hypotensive with NE, but has leveled out 

to 70-80s systolic. Appears Echo will be performed today. Followed by 

Hospitalist, Intensivist, Ortho.











Plan:


Continue ICU per intensivist and Hospitalist. We appreciate all managed care.


Regarding ortho, keep shoulder, left, immobilized with sling.


He may be at 50% WBAT LUE. Will need PT/OT evaluation for best ambulation. 

Walker with forearm platform if possible vs wheelchair


DVT prophylaxis: per Hospitalist. SCDs, Mobilize/ambulate


Discharge: Pending





If there are any questions or issues, please reach out. Karson Jose PA-C for Dr. Rojas [phone]








03/25/19 06:27





Subjective: 





Clemencia is arousable but sleepy. Hard to communicate with CPAP and downs 

syndrome. But responds to commands well. Does not appear to be any any distress 

or significant pain currently.


Objective: 





 Vital Signs











Temp Pulse Resp BP Pulse Ox


 


 37.1 C   68   20   104/49 L  92 


 


 03/25/19 05:00  03/25/19 06:00  03/25/19 06:00  03/25/19 06:00  03/25/19 06:00








 Laboratory Results





 03/25/19 05:30 





 03/25/19 05:30 





 











 03/24/19 03/25/19 03/26/19





 05:59 05:59 05:59


 


Intake Total 250 3607 


 


Output Total 550 2400 


 


Balance -300 1207 








 











PT  13.3 SEC (12.0-15.0)   03/23/19  23:30    


 


INR  1.05  (0.83-1.16)   03/23/19  23:30    








PHYSICAL EXAM


LUE


Dressing intact clean and dry. Sling appropriately fixed. Cryo-cuff applied and 

functioning well.


Squeezed my fingers well. Wiggles his fingers and moves wrist well.


Distal neurovasculature intact.





ICD10 Worksheet


Patient Problems: 


 Problems











Problem Status Onset


 


Fracture dislocation of shoulder joint Acute  


 


CHF (congestive heart failure) Acute  


 


Chronic Disease Mgmt/Transitional Care Acute  


 


Hypoxia Acute  


 


Pneumonia Acute

## 2019-03-25 NOTE — HOSPPROG
Hospitalist Progress Note


Assessment/Plan: 





#Hypotension


-no e/o infection, H/H stable


-indeterminate trop; likely demand ischemia. Repeat trop, echo pending


-off Levophed





#Left prox fracture


-POD #1 left shoulder arthroplasty


-arm in sling, PT when clinically stable





#Down's syndrome: per fam, not very talkative at baseline





#Leukocytosis: trending down. Afebrile





#Seizure: home meds





#FAN: CPAP





#Diet: regular





#DVT ppx: Lovenox





Disp: ICU admission to monitor blood pressure





Left VM with brother and caregiver to give update. Spoke with [id number]














Subjective: denies CP or SOB


Objective: 


 Vital Signs











Temp Pulse Resp BP Pulse Ox


 


 37.1 C   60   14   101/65   99 


 


 03/25/19 05:00  03/25/19 07:30  03/25/19 07:30  03/25/19 07:30  03/25/19 07:30








 Laboratory Results





 03/25/19 05:30 





 03/25/19 05:30 





 











 03/24/19 03/25/19 03/26/19





 05:59 05:59 05:59


 


Intake Total 250 3607 


 


Output Total 550 2400 


 


Balance -300 1207 








 











PT  13.3 SEC (12.0-15.0)   03/23/19  23:30    


 


INR  1.05  (0.83-1.16)   03/23/19  23:30    














- Time Spent With Patient


Time Spent with Patient: greater than 35 minutes


Time Spent with Patient: Greater than 35 minutes spent on this patients care, 

greater than 50% of time spent counseling, educating, and coordinating care 

regarding the above mentioned plan.





- Physical Exam


Constitutional: No uncomfortable


Ears, Nose, Mouth, Throat: moist mucous membranes


Cardiovascular: regular rate and rhythym, systolic murmur


Respiratory: no respiratory distress


Gastrointestinal: normoactive bowel sounds, No tenderness


Musculoskeletal: other (left arm in sling)


Neurologic: CN II-XII Intact





ICD10 Worksheet


Patient Problems: 


 Problems











Problem Status Onset


 


Fracture dislocation of shoulder joint Acute  


 


Chronic Disease Mgmt/Transitional Care Acute  


 


CHF (congestive heart failure) Acute  


 


Hypoxia Acute  


 


Pneumonia Acute

## 2019-03-25 NOTE — ASMTCMCOM
CM Note

 

CM Note                       

Notes:

Patient is POD #1 reverse total shoulder arthroplasty. PT/OT nata pending.



I spoke w patient's 24/7 caregiver Kali Spann. He understands that patient will have more needs 

given his surgery. He is open to SNF but would like to speak w patient's brother Sandoval 

([facility], cell [phone]) as well as an RN that works for his agency about SNF facilities. We also 


talked about patient going home w home care. Kali understands that patient will have more needs 

given his shoulder surgery. Case Management will follow. 

 

Date Signed:  03/25/2019 11:52 AM

Electronically Signed By:Amanda Llanos RN

## 2019-03-26 LAB — PLATELET # BLD: 174 10^3/UL (ref 150–400)

## 2019-03-26 RX ADMIN — ACETAMINOPHEN SCH MG: 500 TABLET ORAL at 20:11

## 2019-03-26 RX ADMIN — SODIUM CHLORIDE SCH MLS: 900 INJECTION, SOLUTION INTRAVENOUS at 02:52

## 2019-03-26 RX ADMIN — PSYLLIUM HUSK SCH EACH: 3.4 GRANULE ORAL at 08:43

## 2019-03-26 RX ADMIN — FLUTICASONE PROPIONATE SCH SPR: 50 SPRAY, METERED NASAL at 20:13

## 2019-03-26 RX ADMIN — THERA TABS SCH EACH: TAB at 08:42

## 2019-03-26 RX ADMIN — PANTOPRAZOLE SODIUM SCH MG: 40 TABLET, DELAYED RELEASE ORAL at 08:43

## 2019-03-26 RX ADMIN — ACETAMINOPHEN SCH MG: 500 TABLET ORAL at 05:44

## 2019-03-26 RX ADMIN — ACETAMINOPHEN SCH: 500 TABLET ORAL at 14:07

## 2019-03-26 RX ADMIN — THERA TABS SCH EACH: TAB at 08:43

## 2019-03-26 RX ADMIN — ENOXAPARIN SODIUM SCH MG: 100 INJECTION SUBCUTANEOUS at 08:43

## 2019-03-26 RX ADMIN — SODIUM CHLORIDE SCH MLS: 900 INJECTION, SOLUTION INTRAVENOUS at 15:14

## 2019-03-26 RX ADMIN — PERPHENAZINE SCH MG: 8 TABLET, FILM COATED ORAL at 20:11

## 2019-03-26 RX ADMIN — LEVOTHYROXINE SODIUM SCH MCG: 100 TABLET ORAL at 05:45

## 2019-03-26 NOTE — HOSPPROG
Hospitalist Progress Note


Assessment/Plan: 





DIAGNOSES: 


* Fall with injury


* Fracture dislocation left humerus, status post reverse left shoulder total 

arthroplasty


* Postoperative hypotension, resolved, uncertain etiology


* Post hemorrhagic anemia after injury and surgery - hemoglobin still decreasing


* Troponin elevations likely caused by hypotension, did not suspect acute 

coronary syndrome


* Chronic gait instability


* Down syndrome


* Seizure disorder on home medicines


* Pain management





PLANS:


* Will review with Dr. Mook Tineo when he arrives


* I believe the patient should be able to leave ICU today


* Repeat hemoglobin again later today to see if it is continuing to drop, along 

with type and cross


* ?  Discharge planning scenario, may need skilled nursing rehabilitation but 

will have to review with family and caretaker





Seen by me today on hospitals rounds as well as multidisciplinary rounds





SUBJECTIVE:


Patient still with some pain


No other difficulties





OBJECTIVE


Vitals reviewed:  Blood pressure is now very stable, pulse in the 50s no fever


Oxygen:  2 L nasal cannula so far


Cardiac Monitor, my review:  Sinus





Exam:


Sleepy but awake, oriented, appears at his baseline mentation


skin warm dry color ok


resps not labored


lungs clear BSs


heart regular


abd soft nondistended nontender, bowel sounds present


limbs warm, no edema





iv site ok





Lab data:


WBC down to 11,000 but still remains elevated


Hemoglobin decreased further to 8














Objective: 


 Vital Signs











Temp Pulse Resp BP Pulse Ox


 


 36.6 C   56 L  21 H  103/53 L  96 


 


 03/25/19 20:00  03/26/19 07:50  03/26/19 07:50  03/26/19 07:50  03/26/19 07:50








 Laboratory Results





 03/26/19 05:20 





 03/25/19 05:30 





 











 03/25/19 03/26/19 03/27/19





 06:59 06:59 06:59


 


Intake Total 3607 2704 


 


Output Total 2400 1276 


 


Balance 1207 1428 








 











PT  13.3 SEC (12.0-15.0)   03/23/19  23:30    


 


INR  1.05  (0.83-1.16)   03/23/19  23:30    














- Time Spent With Patient


Time Spent with Patient: greater than 35 minutes


Time Spent with Patient: Greater than 35 minutes spent on this patients care, 

greater than 50% of time spent counseling, educating, and coordinating care 

regarding the above mentioned plan.





ICD10 Worksheet


Patient Problems: 


 Problems











Problem Status Onset


 


Fracture dislocation of shoulder joint Acute  


 


CHF (congestive heart failure) Acute  


 


Chronic Disease Mgmt/Transitional Care Acute  


 


Hypoxia Acute  


 


Pneumonia Acute

## 2019-03-26 NOTE — ASMTCMCOM
CM Note

 

CM Note                       

Notes:

Spoke with pt's brother and provided education about SNFs. He requested list and would like to 

tour/ make a choice. CM left list in pt's room, notified RN and told pt. 

Pt was in room listening to music. He says he is motivated to keep working out to get stronger and 

is willing to go to SNF. 

CM to follow-up with pt's brother to obtain choice. 



Plan: SNF, pending pt choice

 

Date Signed:  03/26/2019 04:03 PM

Electronically Signed By:CORBY Kiran

## 2019-03-26 NOTE — PDINTPN
Intensivist Progress Note


Assessment/Plan: 


Assessment:





Status post fall, left shoulder fracture, surgical replacement.  Doing well.





Down syndrome





Acute blood-loss anemia.  Hematocrit drifted down to 26, stable today on 

repeat.  No evidence of ongoing active bleeding.





Obstructive sleep apnea:  Secondary to Down's.  CPAP to be brought in.





DVT prophylaxis:  Enoxaparin.  GI prophylaxis:  Pantoprazole.





Fall risk:  Supposed to use a walker at home.  Will be at increased risk 

secondary to left arm/shoulder immobilization





Disposition:  Options being looked at.  May need SNF verses returning home with 

his caregiver.














Plan:  Continue care.  Can transfer to a medical-surgical bed.  Follow H&H.  

Continue CPAP at night.  Oxygen is needed.  Pain control as needed.





Will sign off.  25 min of critical care time spent directly with the patient.  

Discussed with hospitalist, nursing, the ICU multi disciplinary team.








Subjective: 





Doing well.  Without significant obvious pain.


Objective: 





 Vital Signs











Temp Pulse Resp BP Pulse Ox


 


 36.5 C   60   22 H  138/69 H  96 


 


 03/26/19 15:42  03/26/19 15:42  03/26/19 15:42  03/26/19 15:42  03/26/19 15:42








 Laboratory Results





 03/26/19 12:08 





 03/25/19 05:30 





 











 03/25/19 03/26/19 03/27/19





 05:59 05:59 05:59


 


Intake Total 3607 2704 


 


Output Total 2400 1276 350


 


Balance 1207 1428 -350








 











PT  13.3 SEC (12.0-15.0)   03/23/19  23:30    


 


INR  1.05  (0.83-1.16)   03/23/19  23:30    














Physical Exam





- Physical Exam


General Appearance: alert, no apparent distress


EENT: PERRL/EOMI, other (Nasal cannula in place at 2 L)


Neck: No normal inspection (Thick neck)


Respiratory: lungs clear, decreased breath sounds (At bases)


Cardiac/Chest: bradycardia (Sinus)


Abdomen: normal bowel sounds, non-tender, soft (Overweight)


Skin: normal color, warm/dry


Extremities: pedal edema (Trace), other (Left arm in a sling)


Neuro/Psych: no motor/sensory deficits, cognition abnormalities (Consistent 

with down)





ICD10 Worksheet


Patient Problems: 


 Problems











Problem Status Onset


 


Fracture dislocation of shoulder joint Acute  


 


Chronic Disease Mgmt/Transitional Care Acute  


 


CHF (congestive heart failure) Acute  


 


Hypoxia Acute  


 


Pneumonia Acute

## 2019-03-27 RX ADMIN — PSYLLIUM HUSK SCH EACH: 3.4 GRANULE ORAL at 09:54

## 2019-03-27 RX ADMIN — PERPHENAZINE SCH MG: 8 TABLET, FILM COATED ORAL at 20:03

## 2019-03-27 RX ADMIN — ACETAMINOPHEN SCH MG: 500 TABLET ORAL at 05:20

## 2019-03-27 RX ADMIN — LEVOTHYROXINE SODIUM SCH MCG: 100 TABLET ORAL at 05:19

## 2019-03-27 RX ADMIN — ACETAMINOPHEN SCH MG: 500 TABLET ORAL at 21:30

## 2019-03-27 RX ADMIN — FLUTICASONE PROPIONATE SCH SPR: 50 SPRAY, METERED NASAL at 20:03

## 2019-03-27 RX ADMIN — PANTOPRAZOLE SODIUM SCH MG: 40 TABLET, DELAYED RELEASE ORAL at 09:54

## 2019-03-27 RX ADMIN — ENOXAPARIN SODIUM SCH MG: 100 INJECTION SUBCUTANEOUS at 09:54

## 2019-03-27 RX ADMIN — ACETAMINOPHEN SCH MG: 500 TABLET ORAL at 15:45

## 2019-03-27 RX ADMIN — SODIUM CHLORIDE SCH MLS: 900 INJECTION, SOLUTION INTRAVENOUS at 04:25

## 2019-03-27 NOTE — HOSPPROG
Hospitalist Progress Note


Assessment/Plan: 





DIAGNOSES: 


* Fall with injury


* Fracture dislocation left humerus, status post reverse left shoulder total 

arthroplasty


* Postoperative hypotension, resolved, uncertain etiology


* Post hemorrhagic anemia after injury and surgery - hemoglobin still decreasing


* Troponin elevations likely caused by hypotension, did not suspect acute 

coronary syndrome


* Chronic gait instability


* Down syndrome


* Seizure disorder on home medicines


* Pain management





PLANS:


* continue routine post op care now


* PT, OT


* 50% wt bear L arm, keep in sling


* ? Discharge planning will review w , other staff: SNF could be 

very useful, but will  need review w family and caregiver








SUBJECTIVE:


No pain 


hungry anbd thirsty





OBJECTIVE


Vitals reviewed: some BP fluctuation but otherwise stable


Oxygen:  2 L nasal cannula so far


Cardiac Monitor, my review:  Sinus





Exam:


awake, oriented, appears at his baseline mentation


skin warm dry color ok


resps not labored


lungs clear BSs


heart regular


abd soft nondistended nontender, bowel sounds present


limbs shoulder in sling, dressing clean dry, no cellulitis or bleeding





iv site ok




















Objective: 


 Vital Signs











Temp Pulse Resp BP Pulse Ox


 


 37.1 C   51 L  24 H  98/56 L  92 


 


 03/27/19 07:41  03/27/19 07:41  03/27/19 07:41  03/27/19 07:41  03/27/19 07:41








 Laboratory Results





 03/26/19 12:08 





 03/25/19 05:30 





 











 03/26/19 03/27/19 03/28/19





 06:59 06:59 06:59


 


Intake Total 2704 2239 


 


Output Total 1276 350 


 


Balance 1428 1889 








 











PT  13.3 SEC (12.0-15.0)   03/23/19  23:30    


 


INR  1.05  (0.83-1.16)   03/23/19  23:30    














ICD10 Worksheet


Patient Problems: 


 Problems











Problem Status Onset


 


Fracture dislocation of shoulder joint Acute  


 


CHF (congestive heart failure) Acute  


 


Chronic Disease Mgmt/Transitional Care Acute  


 


Hypoxia Acute  


 


Pneumonia Acute

## 2019-03-27 NOTE — ASMTCMCOM
CM Note

 

CM Note                       

Notes:

CM made multiple attempts to contact pt's MDPOA/Brothr Sandoval to determine their choice for SNF, CM 


then called pt's caregiver Kali and he requested [facility] to [facility]. CM submit referrals, [facility] acceptance is pending nurse eval; still awaiting response 

from [facility]. MD and RN updated. 



Plan: [facility] pending acceptance. 

 

Date Signed:  03/27/2019 02:18 PM

Electronically Signed By:CORBY Kiran

## 2019-03-27 NOTE — SOAPPROG
SOAP Progress Note


Assessment/Plan: 


Assessment: POD #3 S/P Reverse Total Shoulder Arthroplasty, Left. Performed by 

Dr. Rojas

















Plan:


Most likely be transferred back to med/surg unit today per intensivist and 

Hospitalist. We appreciate all managed care.


Regarding ortho, keep shoulder, left, immobilized with sling.


He may be at 50% WBAT LUE. Will need continued PT/OT evaluation for best 

ambulation. Walker with forearm platform if possible vs wheelchair


DVT prophylaxis: per Hospitalist. SCDs, Mobilize/ambulate


Discharge: Pending. SNF possibly per Case management and Hospitalist.





If there are any questions or issues, please reach out. Karson Jose PA-C for Dr. Rojas [phone]














03/27/19 07:48





03/27/19 07:51





Subjective: 





No acute distress. responds to commands appropriately. Per nurse, has been in 

good spirit and pain appears to be controlled.


Objective: 





 Vital Signs











Temp Pulse Resp BP Pulse Ox


 


 36.7 C   57 L  16   147/73 H  97 


 


 03/26/19 20:00  03/27/19 05:41  03/27/19 05:41  03/27/19 04:00  03/27/19 05:41








 Laboratory Results





 03/26/19 12:08 





 03/25/19 05:30 





 











 03/26/19 03/27/19 03/28/19





 05:59 05:59 05:59


 


Intake Total 2704 2239 


 


Output Total 1276 350 


 


Balance 1428 1889 








 











PT  13.3 SEC (12.0-15.0)   03/23/19  23:30    


 


INR  1.05  (0.83-1.16)   03/23/19  23:30    








PHYSICAL EXAM


LUE


Bandage clean dry. Cryo-cuff in place. Moves fingers well. Distal 

Neurovasculature appears intact.





ICD10 Worksheet


Patient Problems: 


 Problems











Problem Status Onset


 


Fracture dislocation of shoulder joint Acute  


 


CHF (congestive heart failure) Acute  


 


Chronic Disease Mgmt/Transitional Care Acute  


 


Hypoxia Acute  


 


Pneumonia Acute

## 2019-03-27 NOTE — SOAPPROG
SOAP Progress Note


Assessment/Plan: 


Assessment: Pt. is POD #2 s/p reverse total shoulder arthroplasty, left.  

Surgeon, Dr. Rojas. 





Pt. had been transferred to the ICU following episodes of hypotension, which 

has now stabilized.  He is continuing to be monitored for acute anemia.  It is 

now felt that he is stable enough to be moved to the med-surg floor.  

Hospitalists continue to follow.  





Plan:  


-LUE to remain in sling with abductor pillow. 


-Continue ice


-Continue 50% WB of LUE.  


-Continue to work with PT/OT on best ambulation strategies-preferred walker 

with forearm platform vs. wheelchair


-Continue Tylenol for pain


-Continue Enoxaparin for VTE prophylaxis, per medicine/intensivist 

recommendations. 


-Continue SCD's





Subjective: Clemencia is awake, able to answer some basic questions.  States that 

his shoulder feels ok, still having some pain.  





Objective: Pt. is awake, alert, in NAD-sitting up in chair listening to music.  

Respirations easy and unlabored.  Distally, the pt. has brisk capillary refill 

to all digits in the LUE, skin is warm dry and pink and sensation is intact to 

all fingers and the entire hand to light touch.  Pulses are brisk.  He can move 

all fingers without difficulty.  Examination of the left shoulder reveals an 

intact and dry dressing with no excess fluid accumulation. There is some 

surrounding ecchymosis above and below the dressing and into the pts. axilla, 

but no evidence of cellulitis. His left arm compartments are soft and NTTP.  


























Plan:





03/26/19 23:55





Objective: 





 Vital Signs











Temp Pulse Resp BP Pulse Ox


 


 36.7 C   69   14   151/71 H  95 


 


 03/26/19 20:00  03/26/19 20:00  03/26/19 20:00  03/26/19 20:00  03/26/19 20:00








 Laboratory Results





 03/26/19 12:08 





 03/25/19 05:30 





 











 03/25/19 03/26/19 03/27/19





 05:59 05:59 05:59


 


Intake Total 3607 2704 1177


 


Output Total 2400 1276 350


 


Balance 1207 1428 827








 











PT  13.3 SEC (12.0-15.0)   03/23/19  23:30    


 


INR  1.05  (0.83-1.16)   03/23/19  23:30    














ICD10 Worksheet


Patient Problems: 


 Problems











Problem Status Onset


 


Fracture dislocation of shoulder joint Acute  


 


CHF (congestive heart failure) Acute  


 


Chronic Disease Mgmt/Transitional Care Acute  


 


Hypoxia Acute  


 


Pneumonia Acute

## 2019-03-28 VITALS — SYSTOLIC BLOOD PRESSURE: 100 MMHG | DIASTOLIC BLOOD PRESSURE: 60 MMHG

## 2019-03-28 RX ADMIN — ACETAMINOPHEN SCH: 500 TABLET ORAL at 15:26

## 2019-03-28 RX ADMIN — PSYLLIUM HUSK SCH EACH: 3.4 GRANULE ORAL at 09:03

## 2019-03-28 RX ADMIN — ENOXAPARIN SODIUM SCH MG: 100 INJECTION SUBCUTANEOUS at 09:02

## 2019-03-28 RX ADMIN — PANTOPRAZOLE SODIUM SCH MG: 40 TABLET, DELAYED RELEASE ORAL at 09:03

## 2019-03-28 RX ADMIN — THERA TABS SCH EACH: TAB at 09:03

## 2019-03-28 RX ADMIN — ACETAMINOPHEN SCH MG: 500 TABLET ORAL at 05:59

## 2019-03-28 RX ADMIN — LEVOTHYROXINE SODIUM SCH MCG: 100 TABLET ORAL at 05:59

## 2019-03-28 NOTE — CPEKG
Test Reason : down syndrome, hypoxia

Blood Pressure : ***/*** mmHG

Vent. Rate : 060 BPM     Atrial Rate : 061 BPM

   P-R Int : 186 ms          QRS Dur : 089 ms

    QT Int : 397 ms       P-R-T Axes : -37 027 028 degrees

   QTc Int : 397 ms

 

Sinus rhythm

Atrial premature complex

 

Confirmed by Oniel Bledsoe ([id number]) on 3/28/2019 8:55:22 AM

 

Referred By: Guevara Sutton           Confirmed By:Oniel Bledsoe

## 2019-03-28 NOTE — ASDISCHSUM
----------------------------------------------

Discharge Information

----------------------------------------------

Plan Status:SNF                                      Medically Cleared to Leave:

Discharge Date:03/28/2019 03:26 PM                   CM D/C Disposition:

ADT D/C Disposition:Skilled Nursing Facility         Projected Discharge Date:03/27/2019 11:00 AM

Transportation at D/C:                               Discharge Delay Reason:

Follow-Up Date:03/27/2019 11:00 AM                   Discharge Slot:

Final Diagnosis:

----------------------------------------------

Placement Information

----------------------------------------------

Referral Type:*Nursing Home/SNF                      Referral ID:[id number]

Provider Name:[facility]

Address 1:[address]                    Phone [phone]

Address 2:                                           Fax [phone]

City:[place]                                      Selection Factors:

State:[place]

 

----------------------------------------------

Patient Contact Information

----------------------------------------------

Contact Name:MEKA                            Relationship:Other

Address:[address]                               [place] [phone]

                                                     Work Phone:

City:[place]                                         Alternate Phone:

State/Zip Code:[place] [address]                              Email:

----------------------------------------------

Financial Information

----------------------------------------------

Financial Class:Medicare

Primary Plan Desc:MEDICARE INPATIENT                 Primary Plan Number:[id number]

Secondary Plan Desc[place]      Secondary Plan Number:[id number]

 

 

----------------------------------------------

Assessment Information

----------------------------------------------

----------------------------------------------

LACE

----------------------------------------------

LACE

 

Length of stay for            Answers:  4-6 days                              

current admission                                                             

Acuity / Level of             Answers:  Yes                                   

Care: Did the patient                                                         

have an inpatient                                                             

admission?                                                                    

Comorbidities - select        Answers:  Congestive heart failure              

all that apply                                                                

                                        Other                         Notes:  Down Syndrome, seizure 


                                                                              disorder, heart murmur

# of Emergency department     Answers:  1-2                                   

visits in the last 6                                                          

months                                                                        

Score: 11

 

Date Signed:  03/28/2019 03:10 PM

Electronically Signed By:DENISE Gee

 

 

----------------------------------------------

[facility] CM Progress Note

----------------------------------------------

CM Note

 

CM Note                       

Notes:

Pt with Down Syndrome, resides with caretaker had fall at home when not using his walker. Pt to OR 

today. PT/OT to eval when appropriate. 



Chart review indicates pt has had [facility] in the past. 



CM to follow for d/c planning. 

 

Date Signed:  03/24/2019 02:52 PM

Electronically Signed By:DENISE Gee

 

 

----------------------------------------------

[facility] CM Progress Note

----------------------------------------------

CM Note

 

CM Note                       

Notes:

Patient is POD #1 reverse total shoulder arthroplasty. PT/OT evals pending.



I spoke w patient's 24/7 caregiver Kali Spann. He understands that patient will have more needs 

given his surgery. He is open to SNF but would like to speak w patient's brother Sandoval 

([facility], cell [phone]) as well as an RN that works for his agency about SNF facilities. We also 


talked about patient going home w home care. Kali understands that patient will have more needs 

given his shoulder surgery. Case Management will follow. 

 

Date Signed:  03/25/2019 11:52 AM

Electronically Signed By:Amanda Llanos RN

 

 

----------------------------------------------

[facility] CM Progress Note

----------------------------------------------

CM Note

 

CM Note                       

Notes:

Spoke with pt's brother and provided education about SNFs. He requested list and would like to 

tour/ make a choice. CM left list in pt's room, notified RN and told pt. 

Pt was in room listening to music. He says he is motivated to keep working out to get stronger and 

is willing to go to SNF. 

CM to follow-up with pt's brother to obtain choice. 



Plan: SNF, pending pt choice

 

Date Signed:  03/26/2019 04:03 PM

Electronically Signed By:CORBY Kiran

 

 

----------------------------------------------

[facility] NANCIE Progress Note

----------------------------------------------

CM Note

 

CM Note                       

Notes:

CM made multiple attempts to contact pt's MDPOA/Brothr Sandoval to determine their choice for SNF, NANCIE 


then called pt's caregiver Kali and he requested referrals to be sent to [facility] and [facility]. CM submit referrals, [facility] acceptance is pending nurse eval; still awaiting response 

from [facility]. MD and RN updated. 



Plan: [facility] or [facility] pending acceptance. 

 

Date Signed:  03/27/2019 02:18 PM

Electronically Signed By:CORBY Kiran

 

 

----------------------------------------------

[facility] NANCIE Progress Note

----------------------------------------------

CM Note

 

NANCIE Note                       

Notes:

Pt medically stable for d/c to [facility] SNF, orders sent in [employer]. RN Ramya to call 

report. Frida with [facility] scheduled wc transport for 1430. 



 Pt brother Maikel and caregiver Kali updated and agree with pt d/c. 

 

Date Signed:  03/28/2019 03:13 PM

Electronically Signed By:DENISE Gee

 

 

----------------------------------------------

Intervention Information

----------------------------------------------

Intervention Type:*Incorrect Registration            Date of Service:03/23/2019 06:17 AM

Patient Type:Observation                             Staff Member:Soheila Werner

Hours:                                               Discipline:

Severity:                                            Comment:

Intervention Type:*IM-Signed                         Date of Service:03/28/2019 11:39 AM

Patient Type:Inpatient                               Staff Member:Valerie Fields

Hours:                                               Discipline:

Severity:                                            Comment:

## 2019-03-28 NOTE — ASMTLACE
LACE

 

Length of stay for            Answers:  4-6 days                              

current admission                                                             

Acuity / Level of             Answers:  Yes                                   

Care: Did the patient                                                         

have an inpatient                                                             

admission?                                                                    

Comorbidities - select        Answers:  Congestive heart failure              

all that apply                                                                

                                        Other                         Notes:  Down Syndrome, seizure 


                                                                              disorder, heart murmur

# of Emergency department     Answers:  1-2                                   

visits in the last 6                                                          

months                                                                        

Score: 11

 

Date Signed:  03/28/2019 03:10 PM

Electronically Signed By:DENISE Gee

## 2019-03-28 NOTE — PDIAF
- Diagnosis


Diagnosis: fracture dislocation R shoulder, gait instability, Downs syndrome


Code Status: Full Code





- Medication Management


Discharge Medications: electronically signed and located in the Home Medication 

List.





- Orders


Services needed: Registered Nurse, Certified Nursing Aide, Master 

, Physical Therapy, Occupational Therapy


Diet Recommendation: no restrictions on diet


Diet Texture: Regular Texture Diet


Activity/Weight Bearing Restrictions: walker w elbow support; 50% wt bear with 

R arm


Equipment: walkler w elbow support





- Follow Up Care


Current Providers and Referrals: 


Frankel,Zara, MD [Primary Care Provider] - As per Instructions

## 2019-03-28 NOTE — ASMTCMCOM
CM Note

 

CM Note                       

Notes:

Pt medically stable for d/c to [facility], orders sent in [employer]. RN Ramya to call 

report. Frida with [facility] scheduled wc transport for 1430. 



 Pt brother Maikel and caregiver Kali updated and agree with pt d/c. 

 

Date Signed:  03/28/2019 03:13 PM

Electronically Signed By:DENISE Gee

## 2019-03-28 NOTE — CPEKG
Test Reason : OPEN

Blood Pressure : ***/*** mmHG

Vent. Rate : 077 BPM     Atrial Rate : 069 BPM

   P-R Int : 200 ms          QRS Dur : 097 ms

    QT Int : 385 ms       P-R-T Axes : -06 048 004 degrees

   QTc Int : 436 ms

 

Sinus rhythm

 

Confirmed by Oniel Bledsoe ([id number]) on 3/28/2019 8:57:14 AM

 

Referred By: Guevara Sutton           Confirmed By:Oniel Bledsoe

## 2019-03-29 NOTE — PDDCSUM
Discharge Summary


Discharge Summary: 


DISCHARGE SUMMARY ORTHOPEDIC SURGERY








DISCHARGE DIAGNOSES: Acute vs Chronic Left Shoulder fracture/dislocation





SURGICAL PROCEDURE: Reverse Total Shoulder Arthroplasty, Left





SURGEON: Dr. Sixto Rojas








[facility] COURSE SUMMARY:


This patient who has Down syndrome and a significant gait instability normally 

uses a walker for ambulating.  At his home he got up to do some activity and 

while walking across room had a fall.  He was not using his walker.  He fell 

onto his left side and suffered fracture dislocation at the left shoulder with 

significant displacement.  If taken to the operating room by Dr. Rojas who 

performed a reverse shoulder total arthroplasty.  Intraoperatively there were 

no complications.  However the patient did suffer some significant hypotension 

and postoperative setting but it was unclear what the etiology of this 

hypotension was.  He did not appear to have significant active bleeding after 

the surgery, though was fairly anemic postop.  He was given IV fluid hydration 

and this hypotension did resolve.  The patient tolerated the whole episode well.





At this time he has recovered uneventfully otherwise.  He is eating normally 

working well with physical therapy, no respiratory or cardiac compromise.  He 

does notably have more significant gait instability than his baseline.  At this 

point with his shoulder in immobilization and his gait instability he will need 

ongoing significant supervision and physical occupational therapy 

rehabilitation.











FOLLOW-UP PLAN:


He will be transferred at this time to skilled nursing facility for ongoing 

physical therapy and rehabilitation


He will follow up with Dr. Rojas at [facility] in 10-14 days.

## 2019-03-31 NOTE — PDGENHP
History & Physical


Chief Complaint: Hypotension


History of Present Illness: 63-year-old male with Down syndrome who has 

recently undergone surgery for a shoulder repair after a fall.  He is in the 

ICU I was consulted for line placement because of his ongoing hypotension and 

need for pressors.  Risks and options fully discussed and he and his POA are 

wished to proceed


Pertinent Past, Social, Family History: Past history includes Down syndrome, 

GERD, hypothyroidism, seizure disorder, sleep apnea.  Medications:  Omeprazole, 

Synthroid, Lamictal, CPAP.  Allergies carbamazepine.  Review of systems 

difficult to obtain but negative for any major findings on a 10 point review.  

Social history he is a nonsmoker.  Family history noncontributory


Relevant Physical Exam: Cooperative 63-year-old male who is in no acute distress

, afebrile.  HEENT a Down's face ease, PERRLA, nonicteric.  Neck supple 

nontender.  Chest clear and symmetric breath sounds.  Cor regular rhythm.  

Abdomen soft nontender.  Extremities full range of motion full pulses except 

for his left shoulder which is in a splint.  Psych alert oriented and 

cooperative


Cardiorespiratory Assessment: Impression postoperative hypotension.  Plan 

central line placement for monitoring and pressor access.  Risks and options 

fully discussed and he wishes to proceed

## 2019-03-31 NOTE — POSTOPPROG
Post Op Note


Date of Operation: 03/25/19


Surgeon: Otto Eric


Anesthesia: Local (Specify)


Pre-op Diagnosis: Hypertension


Post-op Diagnosis: Same


Indication: IV access for pressors


Procedure: Right subclavian triple-lumen catheter placement


Findings: Good position and flow


Inf/Abcess present in the surg proc area at time of surgery?: No


Depth: Deep Incisional (Fascial)


EBL: Minimal


Complications: 





None